# Patient Record
Sex: FEMALE | URBAN - METROPOLITAN AREA
[De-identification: names, ages, dates, MRNs, and addresses within clinical notes are randomized per-mention and may not be internally consistent; named-entity substitution may affect disease eponyms.]

---

## 2017-06-27 ENCOUNTER — INPATIENT (INPATIENT)
Facility: HOSPITAL | Age: 82
LOS: 7 days | Discharge: HOME | DRG: 244 | End: 2017-07-05
Attending: INTERNAL MEDICINE | Admitting: INTERNAL MEDICINE
Payer: MEDICARE

## 2017-06-27 VITALS
SYSTOLIC BLOOD PRESSURE: 135 MMHG | WEIGHT: 104.06 LBS | HEIGHT: 59 IN | RESPIRATION RATE: 17 BRPM | TEMPERATURE: 98 F | DIASTOLIC BLOOD PRESSURE: 52 MMHG | OXYGEN SATURATION: 98 % | HEART RATE: 62 BPM

## 2017-06-27 DIAGNOSIS — I21.4 NON-ST ELEVATION (NSTEMI) MYOCARDIAL INFARCTION: ICD-10-CM

## 2017-06-27 DIAGNOSIS — I21.0: ICD-10-CM

## 2017-06-27 DIAGNOSIS — Z90.49 ACQUIRED ABSENCE OF OTHER SPECIFIED PARTS OF DIGESTIVE TRACT: Chronic | ICD-10-CM

## 2017-06-27 PROCEDURE — 93010 ELECTROCARDIOGRAM REPORT: CPT

## 2017-06-27 PROCEDURE — 71010: CPT | Mod: 26

## 2017-06-27 PROCEDURE — 33208 INSRT HEART PM ATRIAL & VENT: CPT | Mod: KX

## 2017-06-27 RX ORDER — ASPIRIN/CALCIUM CARB/MAGNESIUM 324 MG
81 TABLET ORAL DAILY
Refills: 0 | Status: DISCONTINUED | OUTPATIENT
Start: 2017-06-27 | End: 2017-07-05

## 2017-06-27 RX ORDER — CARBIDOPA AND LEVODOPA 25; 100 MG/1; MG/1
1.5 TABLET ORAL DAILY
Refills: 0 | Status: DISCONTINUED | OUTPATIENT
Start: 2017-06-27 | End: 2017-06-29

## 2017-06-27 RX ORDER — CARBIDOPA AND LEVODOPA 25; 100 MG/1; MG/1
1 TABLET ORAL AT BEDTIME
Refills: 0 | Status: DISCONTINUED | OUTPATIENT
Start: 2017-06-27 | End: 2017-06-29

## 2017-06-27 RX ORDER — ROPINIROLE 8 MG/1
2 TABLET, FILM COATED, EXTENDED RELEASE ORAL THREE TIMES A DAY
Refills: 0 | Status: DISCONTINUED | OUTPATIENT
Start: 2017-06-27 | End: 2017-07-05

## 2017-06-27 RX ORDER — LEVETIRACETAM 250 MG/1
250 TABLET, FILM COATED ORAL
Refills: 0 | Status: DISCONTINUED | OUTPATIENT
Start: 2017-06-27 | End: 2017-07-05

## 2017-06-27 RX ORDER — QUETIAPINE FUMARATE 200 MG/1
25 TABLET, FILM COATED ORAL AT BEDTIME
Refills: 0 | Status: DISCONTINUED | OUTPATIENT
Start: 2017-06-27 | End: 2017-07-05

## 2017-06-27 RX ORDER — CARBIDOPA AND LEVODOPA 25; 100 MG/1; MG/1
2 TABLET ORAL DAILY
Refills: 0 | Status: DISCONTINUED | OUTPATIENT
Start: 2017-06-28 | End: 2017-06-29

## 2017-06-27 RX ORDER — CEFAZOLIN SODIUM 1 G
1000 VIAL (EA) INJECTION EVERY 8 HOURS
Refills: 0 | Status: COMPLETED | OUTPATIENT
Start: 2017-06-28 | End: 2017-06-28

## 2017-06-27 RX ORDER — CARBIDOPA AND LEVODOPA 25; 100 MG/1; MG/1
1 TABLET ORAL DAILY
Refills: 0 | Status: DISCONTINUED | OUTPATIENT
Start: 2017-06-28 | End: 2017-06-29

## 2017-06-27 RX ADMIN — ROPINIROLE 2 MILLIGRAM(S): 8 TABLET, FILM COATED, EXTENDED RELEASE ORAL at 22:25

## 2017-06-27 RX ADMIN — CARBIDOPA AND LEVODOPA 1 TABLET(S): 25; 100 TABLET ORAL at 22:25

## 2017-06-27 RX ADMIN — QUETIAPINE FUMARATE 25 MILLIGRAM(S): 200 TABLET, FILM COATED ORAL at 22:25

## 2017-06-27 NOTE — H&P CARDIOLOGY - REVIEW OF SYSTEMS
see HPI  Pt. found to have ecchymotic area over left eye and left forehead-daughter unaware of how/when pt had this occur.

## 2017-06-27 NOTE — H&P CARDIOLOGY - PMH
COPD (chronic obstructive pulmonary disease)    Dementia    Dysphagia    Former smoker    History of cholecystectomy    Parkinsons    Sick sinus syndrome    Syncope

## 2017-06-27 NOTE — H&P CARDIOLOGY - HISTORY OF PRESENT ILLNESS
This is a 89 yo female [facility] Dementia, Parkinson's, COPD, HTN, presented to hospital 5/27/17-6/2/17 s/p syncopal episode vs seizure per daughter. Pt. 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 Pt. was at home on 6/22/17 and had witnessed episode of blank stare and unresponsiveness lasting approx 20-30 seconds. Pt. was previously DNR now with MOLST form agreeable to treatment. Recent UTI treated with Cipro. Pt. now transferred for further evaluation and placement of PPM. This is a 89 yo female [facility] Dementia, Parkinson's, COPD, HTN, presented to hospital 5/27/17-6/2/17 s/p syncopal episode vs seizure per daughter. Pt. 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 Pt. was at home on 6/22/17 and had witnessed episode of blank stare and unresponsiveness lasting approx 20-30 seconds. Pt. was previously DNR now with MOLST form agreeable to treatment. Recent UTI treated with Cipro. Cardiac biomarkers. Pt. now transferred for further evaluation and placement of PPM.

## 2017-06-28 ENCOUNTER — TRANSCRIPTION ENCOUNTER (OUTPATIENT)
Age: 82
End: 2017-06-28

## 2017-06-28 DIAGNOSIS — I44.30 UNSPECIFIED ATRIOVENTRICULAR BLOCK: ICD-10-CM

## 2017-06-28 LAB
ANION GAP SERPL CALC-SCNC: 14 MMOL/L — SIGNIFICANT CHANGE UP (ref 5–17)
BUN SERPL-MCNC: 27 MG/DL — HIGH (ref 7–23)
CALCIUM SERPL-MCNC: 9.1 MG/DL — SIGNIFICANT CHANGE UP (ref 8.4–10.5)
CHLORIDE SERPL-SCNC: 101 MMOL/L — SIGNIFICANT CHANGE UP (ref 96–108)
CO2 SERPL-SCNC: 23 MMOL/L — SIGNIFICANT CHANGE UP (ref 22–31)
CREAT SERPL-MCNC: 0.91 MG/DL — SIGNIFICANT CHANGE UP (ref 0.5–1.3)
GLUCOSE SERPL-MCNC: 108 MG/DL — HIGH (ref 70–99)
HCT VFR BLD CALC: 43.3 % — SIGNIFICANT CHANGE UP (ref 34.5–45)
HGB BLD-MCNC: 14.1 G/DL — SIGNIFICANT CHANGE UP (ref 11.5–15.5)
MCHC RBC-ENTMCNC: 31 PG — SIGNIFICANT CHANGE UP (ref 27–34)
MCHC RBC-ENTMCNC: 32.6 GM/DL — SIGNIFICANT CHANGE UP (ref 32–36)
MCV RBC AUTO: 94.9 FL — SIGNIFICANT CHANGE UP (ref 80–100)
PLATELET # BLD AUTO: 169 K/UL — SIGNIFICANT CHANGE UP (ref 150–400)
POTASSIUM SERPL-MCNC: 4.5 MMOL/L — SIGNIFICANT CHANGE UP (ref 3.5–5.3)
POTASSIUM SERPL-SCNC: 4.5 MMOL/L — SIGNIFICANT CHANGE UP (ref 3.5–5.3)
RBC # BLD: 4.57 M/UL — SIGNIFICANT CHANGE UP (ref 3.8–5.2)
RBC # FLD: 12.4 % — SIGNIFICANT CHANGE UP (ref 10.3–14.5)
SODIUM SERPL-SCNC: 138 MMOL/L — SIGNIFICANT CHANGE UP (ref 135–145)
WBC # BLD: 13.2 K/UL — HIGH (ref 3.8–10.5)
WBC # FLD AUTO: 13.2 K/UL — HIGH (ref 3.8–10.5)

## 2017-06-28 RX ADMIN — Medication 100 MILLIGRAM(S): at 17:55

## 2017-06-28 RX ADMIN — Medication 100 MILLIGRAM(S): at 09:31

## 2017-06-28 RX ADMIN — Medication 100 MILLIGRAM(S): at 00:30

## 2017-06-28 RX ADMIN — ROPINIROLE 2 MILLIGRAM(S): 8 TABLET, FILM COATED, EXTENDED RELEASE ORAL at 21:18

## 2017-06-28 RX ADMIN — CARBIDOPA AND LEVODOPA 1.5 TABLET(S): 25; 100 TABLET ORAL at 12:33

## 2017-06-28 RX ADMIN — ROPINIROLE 2 MILLIGRAM(S): 8 TABLET, FILM COATED, EXTENDED RELEASE ORAL at 06:16

## 2017-06-28 RX ADMIN — CARBIDOPA AND LEVODOPA 1 TABLET(S): 25; 100 TABLET ORAL at 21:19

## 2017-06-28 RX ADMIN — Medication 81 MILLIGRAM(S): at 12:33

## 2017-06-28 RX ADMIN — QUETIAPINE FUMARATE 25 MILLIGRAM(S): 200 TABLET, FILM COATED ORAL at 21:18

## 2017-06-28 RX ADMIN — LEVETIRACETAM 250 MILLIGRAM(S): 250 TABLET, FILM COATED ORAL at 17:59

## 2017-06-28 RX ADMIN — ROPINIROLE 2 MILLIGRAM(S): 8 TABLET, FILM COATED, EXTENDED RELEASE ORAL at 14:36

## 2017-06-28 RX ADMIN — CARBIDOPA AND LEVODOPA 1.5 TABLET(S): 25; 100 TABLET ORAL at 12:17

## 2017-06-28 RX ADMIN — LEVETIRACETAM 250 MILLIGRAM(S): 250 TABLET, FILM COATED ORAL at 06:15

## 2017-06-28 NOTE — DIETITIAN INITIAL EVALUATION ADULT. - OTHER INFO
consult for chew swallow difficulty. consumes ~ 1 x Ensure daily PTA. daughter states that pt ws transferred from [facility], there she was on a nectar consistency diet but did not know if a swallow evaluation was performed. she states they told her it was a precaution. last BM 2 days ago. NKFA consult for chew swallow difficulty. consumes ~ 1 x Ensure daily PTA. daughter states that pt ws transferred from [facility] for further evaluation and placement of permanent pacemaker, there she was on a nectar consistency diet but did not know if a swallow evaluation was performed. she states they told her it was a precaution. last BM 2 days ago. NKFA consult for chew swallow difficulty. consumes ~ 1 x Ensure daily PTA. daughter states that pt ws transferred from [facility] for further evaluation and placement of permanent pacemaker, there she was on a nectar consistency diet but did not know if a swallow evaluation was performed. she states they told her it was a precaution. last BM 2 days ago. NKFA. Electro Physiology states that pt is being discharged today.

## 2017-06-28 NOTE — DISCHARGE NOTE ADULT - CARE PLAN
Principal Discharge DX:	Sick sinus syndrome  Goal:	s/p PPM  Instructions for follow-up, activity and diet:	your pacemaker can send an electrical signal to your heart when it is necessary  call your doctor if you feel dizzy, lightheaded, shortness of breath, confused, more tired, faint  you will follow up with your pacemaker doctor regularly to check your pacemaker  your pacemaker battery usually will last 5 - 8 years  avoid certain electric or magnetic equipment  if you cannot walk through metal detector, ask for hand security search  most of the time you will not be able to have MRI  follow directions  that you received about arm use and mobility, driving, sex & sling use  you make want to get a emergency medical bracelet telling peoply you have a pacemaker  tell any health care provider that you have a pacemaker  Secondary Diagnosis:	Parkinsons  Instructions for follow-up, activity and diet:	c/w sinemet Principal Discharge DX:	Sick sinus syndrome  Goal:	Status post Permanent pacemaker placement  Instructions for follow-up, activity and diet:	your pacemaker can send an electrical signal to your heart when it is necessary  call your doctor if you feel dizzy, lightheaded, shortness of breath, confused, more tired, faint  you will follow up with your pacemaker doctor regularly to check your pacemaker  your pacemaker battery usually will last 5 - 8 years  avoid certain electric or magnetic equipment  if you cannot walk through metal detector, ask for hand security search  most of the time you will not be able to have MRI  follow directions  that you received about arm use and mobility, driving, sex & sling use  you make want to get a emergency medical bracelet telling peoply you have a pacemaker  tell any health care provider that you have a pacemaker.  Follow-up with EP physician in 7 to 10 days. Call for appointment .  Secondary Diagnosis:	Parkinsons  Goal:	stable  Instructions for follow-up, activity and diet:	Continue with sinemet  Secondary Diagnosis:	COPD (chronic obstructive pulmonary disease)  Goal:	stable  Instructions for follow-up, activity and diet:	Call your Health Care provider upon arrival home to make a follow up appointment within one week.  Take all inhalers as prescribed by your Health Care Provider.  Take steroids as prescribed by your Health Care Provider.  If your cough increases infrequency and severity and/or you have shortness of breath or increased shortness of breath call your Health Care Provider.  If you develop fever, chills, night sweats, malaise, and/or change in mental status call your Health care Provider.  Nutrition is very important.  Eat small frequent meals.  Increase your activity as tolerated.  Do not stay in bed all day  Secondary Diagnosis:	Dementia  Instructions for follow-up, activity and diet:	To find out if someone is confused ask him or their name, age, and the date. If the person is unsure or answers incorrectly, he or she is confused.  Always introduce yourself, no matter how well the person knows you.  Often remind the person of his or her location.  Place a calendar and clock near the confused person.  Talk about current events and plans for the day.  Try to keep the environment calm, quiet and peaceful.  Make sure the patient keeps follow up appointments with their physician.  PREVENTION  Ways to prevent confusion:  Avoid alcohol.  Eat a balanced diet.  Get enough sleep.  Do not become isolated. Spend time with other people and make plans for your days.  Keep careful watch on your blood sugar levels if you are diabetic.  SEEK IMMEDIATE MEDICAL CARE IF:  You develop severe headaches, repeated vomiting, seizures, blackouts or slurred speech.  There is increasing confusion, weakness, numbness, restlessness or personality changes.  You develop a loss of balance, have marked dizziness, feel uncoordinated or fall.  You have delusions, hallucinations or develop severe anxiety.  Your family members think you need to be rechecked Principal Discharge DX:	Sick sinus syndrome  Goal:	Status post Permanent pacemaker placement  Instructions for follow-up, activity and diet:	You have a Wound check appointment with EP ([phone]) on July 12th, at 9am.  your pacemaker can send an electrical signal to your heart when it is necessary  call your doctor if you feel dizzy, lightheaded, shortness of breath, confused, more tired, faint  you will follow up with your pacemaker doctor regularly to check your pacemaker  your pacemaker battery usually will last 5 - 8 years  avoid certain electric or magnetic equipment  if you cannot walk through metal detector, ask for hand security search  most of the time you will not be able to have MRI  follow directions  that you received about arm use and mobility, driving, sex & sling use  you make want to get a emergency medical bracelet telling peoply you have a pacemaker  tell any health care provider that you have a pacemaker.  Follow-up with EP physician in 7 to 10 days. Call for appointment .  Secondary Diagnosis:	Parkinsons  Goal:	stable  Instructions for follow-up, activity and diet:	Continue with sinemet  Secondary Diagnosis:	COPD (chronic obstructive pulmonary disease)  Goal:	stable  Instructions for follow-up, activity and diet:	Call your Health Care provider upon arrival home to make a follow up appointment within one week.  Take all inhalers as prescribed by your Health Care Provider.  Take steroids as prescribed by your Health Care Provider.  If your cough increases infrequency and severity and/or you have shortness of breath or increased shortness of breath call your Health Care Provider.  If you develop fever, chills, night sweats, malaise, and/or change in mental status call your Health care Provider.  Nutrition is very important.  Eat small frequent meals.  Increase your activity as tolerated.  Do not stay in bed all day  Secondary Diagnosis:	Dementia  Instructions for follow-up, activity and diet:	To find out if someone is confused ask him or their name, age, and the date. If the person is unsure or answers incorrectly, he or she is confused.  Always introduce yourself, no matter how well the person knows you.  Often remind the person of his or her location.  Place a calendar and clock near the confused person.  Talk about current events and plans for the day.  Try to keep the environment calm, quiet and peaceful.  Make sure the patient keeps follow up appointments with their physician.  PREVENTION  Ways to prevent confusion:  Avoid alcohol.  Eat a balanced diet.  Get enough sleep.  Do not become isolated. Spend time with other people and make plans for your days.  Keep careful watch on your blood sugar levels if you are diabetic.  SEEK IMMEDIATE MEDICAL CARE IF:  You develop severe headaches, repeated vomiting, seizures, blackouts or slurred speech.  There is increasing confusion, weakness, numbness, restlessness or personality changes.  You develop a loss of balance, have marked dizziness, feel uncoordinated or fall.  You have delusions, hallucinations or develop severe anxiety.  Your family members think you need to be rechecked Principal Discharge DX:	Sick sinus syndrome  Goal:	Status post Permanent pacemaker placement  Instructions for follow-up, activity and diet:	You have a Wound check appointment with EP ([phone]) on July 12th, at 9am.  your pacemaker can send an electrical signal to your heart when it is necessary  call your doctor if you feel dizzy, lightheaded, shortness of breath, confused, more tired, faint  you will follow up with your pacemaker doctor regularly to check your pacemaker  your pacemaker battery usually will last 5 - 8 years  avoid certain electric or magnetic equipment  if you cannot walk through metal detector, ask for hand security search  most of the time you will not be able to have MRI  follow directions  that you received about arm use and mobility, driving, sex & sling use  you make want to get a emergency medical bracelet telling peoply you have a pacemaker  tell any health care provider that you have a pacemaker.  Follow-up with EP physician in 7 to 10 days. Call for appointment .  Secondary Diagnosis:	Parkinsons  Goal:	stable  Instructions for follow-up, activity and diet:	Continue with sinemet  Secondary Diagnosis:	COPD (chronic obstructive pulmonary disease)  Goal:	stable  Instructions for follow-up, activity and diet:	Call your Health Care provider upon arrival home to make a follow up appointment within one week.  Take all inhalers as prescribed by your Health Care Provider.  Take steroids as prescribed by your Health Care Provider.  If your cough increases infrequency and severity and/or you have shortness of breath or increased shortness of breath call your Health Care Provider.  If you develop fever, chills, night sweats, malaise, and/or change in mental status call your Health care Provider.  Nutrition is very important.  Eat small frequent meals.  Increase your activity as tolerated.  Do not stay in bed all day  Secondary Diagnosis:	Dementia  Instructions for follow-up, activity and diet:	To find out if someone is confused ask him or their name, age, and the date. If the person is unsure or answers incorrectly, he or she is confused.  Always introduce yourself, no matter how well the person knows you.  Often remind the person of his or her location.  Place a calendar and clock near the confused person.  Talk about current events and plans for the day.  Try to keep the environment calm, quiet and peaceful.  Make sure the patient keeps follow up appointments with their physician.  PREVENTION  Ways to prevent confusion:  Avoid alcohol.  Eat a balanced diet.  Get enough sleep.  Do not become isolated. Spend time with other people and make plans for your days.  Keep careful watch on your blood sugar levels if you are diabetic.  SEEK IMMEDIATE MEDICAL CARE IF:  You develop severe headaches, repeated vomiting, seizures, blackouts or slurred speech.  There is increasing confusion, weakness, numbness, restlessness or personality changes.  You develop a loss of balance, have marked dizziness, feel uncoordinated or fall.  You have delusions, hallucinations or develop severe anxiety.  Your family members think you need to be rechecked Principal Discharge DX:	Sick sinus syndrome  Goal:	Status post Permanent pacemaker placement  Instructions for follow-up, activity and diet:	You have a Wound check appointment with EP ([phone]) on July 12th, at 9am.  your pacemaker can send an electrical signal to your heart when it is necessary  call your doctor if you feel dizzy, lightheaded, shortness of breath, confused, more tired, faint  you will follow up with your pacemaker doctor regularly to check your pacemaker  your pacemaker battery usually will last 5 - 8 years  avoid certain electric or magnetic equipment  if you cannot walk through metal detector, ask for hand security search  most of the time you will not be able to have MRI  follow directions  that you received about arm use and mobility, driving, sex & sling use  you make want to get a emergency medical bracelet telling peoply you have a pacemaker  tell any health care provider that you have a pacemaker.  Follow-up with EP physician in 7 to 10 days. Call for appointment .  Secondary Diagnosis:	Parkinsons  Goal:	stable  Instructions for follow-up, activity and diet:	Continue with sinemet  Secondary Diagnosis:	COPD (chronic obstructive pulmonary disease)  Goal:	stable  Instructions for follow-up, activity and diet:	Call your Health Care provider upon arrival home to make a follow up appointment within one week.  Take all inhalers as prescribed by your Health Care Provider.  Take steroids as prescribed by your Health Care Provider.  If your cough increases infrequency and severity and/or you have shortness of breath or increased shortness of breath call your Health Care Provider.  If you develop fever, chills, night sweats, malaise, and/or change in mental status call your Health care Provider.  Nutrition is very important.  Eat small frequent meals.  Increase your activity as tolerated.  Do not stay in bed all day  Secondary Diagnosis:	Dementia  Instructions for follow-up, activity and diet:	To find out if someone is confused ask him or their name, age, and the date. If the person is unsure or answers incorrectly, he or she is confused.  Always introduce yourself, no matter how well the person knows you.  Often remind the person of his or her location.  Place a calendar and clock near the confused person.  Talk about current events and plans for the day.  Try to keep the environment calm, quiet and peaceful.  Make sure the patient keeps follow up appointments with their physician.  PREVENTION  Ways to prevent confusion:  Avoid alcohol.  Eat a balanced diet.  Get enough sleep.  Do not become isolated. Spend time with other people and make plans for your days.  Keep careful watch on your blood sugar levels if you are diabetic.  SEEK IMMEDIATE MEDICAL CARE IF:  You develop severe headaches, repeated vomiting, seizures, blackouts or slurred speech.  There is increasing confusion, weakness, numbness, restlessness or personality changes.  You develop a loss of balance, have marked dizziness, feel uncoordinated or fall.  You have delusions, hallucinations or develop severe anxiety.  Your family members think you need to be rechecked

## 2017-06-28 NOTE — DISCHARGE NOTE ADULT - CARE PROVIDER_API CALL
Taiwo Lewis (DO), Neurology  [address] [facility], [place] [facility]  Phone: [phone]  Fax: [phone] Taiwo Lewis (DO), Neurology  [address] [place] [address]  Phone: [phone]  Fax: [phone]    Amber Lewis), Cardiac Electrophysiology; Cardiovascular Disease  [address] [place] [address]  Phone: [phone]  Fax: [phone]

## 2017-06-28 NOTE — DISCHARGE NOTE ADULT - MEDICATION SUMMARY - MEDICATIONS TO TAKE
I will START or STAY ON the medications listed below when I get home from the hospital:    Aspirin Enteric Coated 81 mg oral delayed release tablet  -- 1 tab(s) by mouth once a day Home  -- Indication: For Cardiac protection    Keppra 250 mg oral tablet  -- 1 tab(s) by mouth 2 times a day Home  -- Indication: For Seizures    Sinemet 25 mg-100 mg oral tablet  -- 1.5 tab(s) by mouth 2 times a day AM and Lunch  -- Indication: For Parkinsons    rOPINIRole 2 mg oral tablet  -- 1 tab(s) by mouth 3 times a day Home  -- Indication: For Parkinsons    Sinemet 25 mg-100 mg oral tablet  -- 2 tab(s) by mouth once a day at 1800  -- Indication: For Parkinsons    Sinemet CR 50 mg-200 mg oral tablet, extended release  -- 1 tab(s) by mouth once a day (at bedtime)  -- Indication: For Parkinsons    SEROquel 25 mg oral tablet  -- 1 tab(s) by mouth once a day (at bedtime) Home  -- Indication: For Demenatia sleep    rivastigmine 6 mg oral capsule  -- 1 cap(s) by mouth 2 times a day Home  -- Indication: For Dementia , parkinsons I will START or STAY ON the medications listed below when I get home from the hospital:    Aspirin Enteric Coated 81 mg oral delayed release tablet  -- 1 tab(s) by mouth once a day Home  -- Indication: For Cardiac protection    acetaminophen 325 mg oral tablet  -- 2 tab(s) by mouth every 6 hours, As needed, Mild Pain (1 - 3)  -- Indication: For Pain    Keppra 250 mg oral tablet  -- 1 tab(s) by mouth 2 times a day Home  -- Indication: For Seizures    Sinemet 25 mg-100 mg oral tablet  -- 1.5 tab(s) by mouth 2 times a day AM and Lunch  -- Indication: For Parkinsons    rOPINIRole 2 mg oral tablet  -- 1 tab(s) by mouth 3 times a day Home  -- Indication: For Parkinsons    Sinemet 25 mg-100 mg oral tablet  -- 2 tab(s) by mouth once a day at 1800  -- Indication: For Parkinsons    Sinemet CR 50 mg-200 mg oral tablet, extended release  -- 1 tab(s) by mouth once a day (at bedtime)  -- Indication: For Parkinsons    SEROquel 25 mg oral tablet  -- 1 tab(s) by mouth once a day (at bedtime) Home  -- Indication: For Demenatia sleep    rivastigmine 6 mg oral capsule  -- 1 cap(s) by mouth 2 times a day Home  -- Indication: For Dementia , parkinsons I will START or STAY ON the medications listed below when I get home from the hospital:    Aspirin Enteric Coated 81 mg oral delayed release tablet  -- 1 tab(s) by mouth once a day Home  -- Indication: For Cardiac protection    acetaminophen 325 mg oral tablet  -- 2 tab(s) by mouth every 6 hours, As needed, Mild Pain (1 - 3)  -- Indication: For Pain    Keppra 250 mg oral tablet  -- 1 tab(s) by mouth 2 times a day Home  -- Indication: For Seizures    rOPINIRole 2 mg oral tablet  -- 1 tab(s) by mouth 3 times a day Home  -- Indication: For Parkinsons    Sinemet 25 mg-100 mg oral tablet  -- 2 tab(s) by mouth once a day at 1800  -- Indication: For Parkinsons    Sinemet CR 50 mg-200 mg oral tablet, extended release  -- 1 tab(s) by mouth once a day (at bedtime)  -- Indication: For Parkinsons    Sinemet 25 mg-100 mg oral tablet  -- 1.5 tab(s) by mouth 2 times a day AM and Lunch  -- Indication: For Parkinsons    SEROquel 25 mg oral tablet  -- 1 tab(s) by mouth once a day (at bedtime) Home  -- Indication: For Demenatia sleep

## 2017-06-28 NOTE — DISCHARGE NOTE ADULT - PATIENT PORTAL LINK FT
“You can access the FollowHealth Patient Portal, offered by [facility], by registering with the following website: [email or web address]”

## 2017-06-28 NOTE — DISCHARGE NOTE ADULT - PLAN OF CARE
s/p PPM your pacemaker can send an electrical signal to your heart when it is necessary  call your doctor if you feel dizzy, lightheaded, shortness of breath, confused, more tired, faint  you will follow up with your pacemaker doctor regularly to check your pacemaker  your pacemaker battery usually will last 5 - 8 years  avoid certain electric or magnetic equipment  if you cannot walk through metal detector, ask for hand security search  most of the time you will not be able to have MRI  follow directions  that you received about arm use and mobility, driving, sex & sling use  you make want to get a emergency medical bracelet telling peoply you have a pacemaker  tell any health care provider that you have a pacemaker c/w sinemet stable Call your Health Care provider upon arrival home to make a follow up appointment within one week.  Take all inhalers as prescribed by your Health Care Provider.  Take steroids as prescribed by your Health Care Provider.  If your cough increases infrequency and severity and/or you have shortness of breath or increased shortness of breath call your Health Care Provider.  If you develop fever, chills, night sweats, malaise, and/or change in mental status call your Health care Provider.  Nutrition is very important.  Eat small frequent meals.  Increase your activity as tolerated.  Do not stay in bed all day To find out if someone is confused ask him or their name, age, and the date. If the person is unsure or answers incorrectly, he or she is confused.  Always introduce yourself, no matter how well the person knows you.  Often remind the person of his or her location.  Place a calendar and clock near the confused person.  Talk about current events and plans for the day.  Try to keep the environment calm, quiet and peaceful.  Make sure the patient keeps follow up appointments with their physician.  PREVENTION  Ways to prevent confusion:  Avoid alcohol.  Eat a balanced diet.  Get enough sleep.  Do not become isolated. Spend time with other people and make plans for your days.  Keep careful watch on your blood sugar levels if you are diabetic.  SEEK IMMEDIATE MEDICAL CARE IF:  You develop severe headaches, repeated vomiting, seizures, blackouts or slurred speech.  There is increasing confusion, weakness, numbness, restlessness or personality changes.  You develop a loss of balance, have marked dizziness, feel uncoordinated or fall.  You have delusions, hallucinations or develop severe anxiety.  Your family members think you need to be rechecked Status post Permanent pacemaker placement your pacemaker can send an electrical signal to your heart when it is necessary  call your doctor if you feel dizzy, lightheaded, shortness of breath, confused, more tired, faint  you will follow up with your pacemaker doctor regularly to check your pacemaker  your pacemaker battery usually will last 5 - 8 years  avoid certain electric or magnetic equipment  if you cannot walk through metal detector, ask for hand security search  most of the time you will not be able to have MRI  follow directions  that you received about arm use and mobility, driving, sex & sling use  you make want to get a emergency medical bracelet telling peoply you have a pacemaker  tell any health care provider that you have a pacemaker.  Follow-up with EP physician in 7 to 10 days. Call for appointment . Continue with sinemet You have a Wound check appointment with EP ([phone]) on July 12th, at 9am.  your pacemaker can send an electrical signal to your heart when it is necessary  call your doctor if you feel dizzy, lightheaded, shortness of breath, confused, more tired, faint  you will follow up with your pacemaker doctor regularly to check your pacemaker  your pacemaker battery usually will last 5 - 8 years  avoid certain electric or magnetic equipment  if you cannot walk through metal detector, ask for hand security search  most of the time you will not be able to have MRI  follow directions  that you received about arm use and mobility, driving, sex & sling use  you make want to get a emergency medical bracelet telling peoply you have a pacemaker  tell any health care provider that you have a pacemaker.  Follow-up with EP physician in 7 to 10 days. Call for appointment .

## 2017-06-28 NOTE — DISCHARGE NOTE ADULT - ADDITIONAL INSTRUCTIONS
please follow up with EP on 07/07/2017 for wound check Follow up with EP on 07/12/2017 at 9am for wound check

## 2017-06-28 NOTE — PROGRESS NOTE ADULT - PROBLEM SELECTOR PLAN 1
telemetry stable  post device teaching done with family, ID card book given to family  chest xray lead tips  in good placement   VSS LABS stable, clear by EPS to go home today after last dose of ABX to F/U in EP lab on telemetry stable  post device teaching done with family, ID card book given to family  chest xray lead tips  in good placement   VSS LABS stable, clear by EPS to go home today after last dose of ABX to F/U in EP clinic in 7- 10 days [phone]

## 2017-06-28 NOTE — PROGRESS NOTE ADULT - ASSESSMENT
89 yo f [facility] Dementia, Parkinson's, COPD, HTN, presented to hospital 5/27/17-6/2/17 s/p syncopal episode vs seizure per daughter. Holter monitor reveals  2nd degree HB. Witnessed episode of blank stare on 6/22 at home  and unresponsiveness lasting approx 20-30 seconds. Previously DNR now with MOLST form. Now s/p transfer to [facility]  s/p PPM  implant 6/27.  This is a 89 yo female PMH Dementia, Parkinson's, COPD, HTN, presented to hospital 5/27/17-6/2/17 s/p syncopal episode vs seizure per daughter. Pt. 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 Pt. was at home on 6/22/17 and had witnessed episode of blank stare and unresponsiveness lasting approx 20-30 seconds. Pt. was previously DNR now with MOLST form agreeable to treatment. Recent UTI treated with Cipro. Pt. now transferred for further evaluation and placement of PPM.

## 2017-06-28 NOTE — DISCHARGE NOTE ADULT - MEDICATION SUMMARY - MEDICATIONS TO STOP TAKING
I will STOP taking the medications listed below when I get home from the hospital:  None I will STOP taking the medications listed below when I get home from the hospital:    rivastigmine 6 mg oral capsule  -- 1 cap(s) by mouth 2 times a day Home

## 2017-06-28 NOTE — DISCHARGE NOTE ADULT - CARE PROVIDERS DIRECT ADDRESSES
,DirectAddress_Unknown ,DirectAddress_Unknown,[email or web address]

## 2017-06-28 NOTE — PROGRESS NOTE ADULT - SUBJECTIVE AND OBJECTIVE BOX
HPI: sitting up in chair   MEDICATIONS  (STANDING):  ceFAZolin   IVPB 1000 milliGRAM(s) IV Intermittent every 8 hours  aspirin enteric coated 81 milliGRAM(s) Oral daily  levETIRAcetam 250 milliGRAM(s) Oral two times a day  rOPINIRole 2 milliGRAM(s) Oral three times a day  QUEtiapine 25 milliGRAM(s) Oral at bedtime  carbidopa/levodopa  25/100 1.5 Tablet(s) Oral daily  carbidopa/levodopa CR 50/200 1 Tablet(s) Oral at bedtime  carbidopa/levodopa  25/100 1 Tablet(s) Oral daily  carbidopa/levodopa  25/100 2 Tablet(s) Oral daily    MEDICATIONS  (PRN):    Allergies    No Known Allergies    Intolerances    Namenda (Other (Severe))    ROS:  Shakes head no to chest pain SOB HA, coughing abd painVital Signs Last 24 Hrs  T(C): 36.6 (28 Jun 2017 14:11), Max: 36.9 (28 Jun 2017 04:15)  T(F): 97.8 (28 Jun 2017 14:11), Max: 98.4 (28 Jun 2017 04:15)  HR: 90 (28 Jun 2017 14:11) (60 - 90)  BP: 125/73 (28 Jun 2017 14:11) (99/51 - 152/71)  BP(mean): --  RR: 18 (28 Jun 2017 14:11) (17 - 18)  SpO2: 92% (28 Jun 2017 14:11) (92% - 97%)  Physical Exam:  gen thin elderly female   Neurological: Alert, calm cooperative, following commands  HEENT:   Neck supple.  Respiratory: CTA B/L, No wheezing/crackles/rhonchi  Cardiovascular: (+) S1 & S2, RRR  Gastrointestinal: soft, NT, nondistended, (+) BS  Extremities: No pedal edemaperipheral pulses positive   Skin:left infraclavicular incision line intact no hematoma    LABS:                        14.1   13.2  )-----------( 169      ( 28 Jun 2017 06:59 )             43.3     06-28    138  |  101  |  27<H>  ----------------------------<  108<H>  4.5   |  23  |  0.91    Ca    9.1      28 Jun 2017 06:59    RADIOLOGY & ADDITIONAL TESTS:  chest xray 6/27 lungs clear , no pneumothorax    TELE:   EKG: HPI: sitting up in chair   MEDICATIONS  (STANDING):  ceFAZolin   IVPB 1000 milliGRAM(s) IV Intermittent every 8 hours  aspirin enteric coated 81 milliGRAM(s) Oral daily  levETIRAcetam 250 milliGRAM(s) Oral two times a day  rOPINIRole 2 milliGRAM(s) Oral three times a day  QUEtiapine 25 milliGRAM(s) Oral at bedtime  carbidopa/levodopa  25/100 1.5 Tablet(s) Oral daily  carbidopa/levodopa CR 50/200 1 Tablet(s) Oral at bedtime  carbidopa/levodopa  25/100 1 Tablet(s) Oral daily  carbidopa/levodopa  25/100 2 Tablet(s) Oral daily    No Known Allergies    Intolerances    Namenda (Other (Severe))    ROS:  Shakes head no to chest pain SOB HA, coughing abd painVital Signs Last 24 Hrs    T(C): 36.6 (28 Jun 2017 14:11), Max: 36.9 (28 Jun 2017 04:15)  T(F): 97.8 (28 Jun 2017 14:11), Max: 98.4 (28 Jun 2017 04:15)  HR: 90 (28 Jun 2017 14:11) (60 - 90)  BP: 125/73 (28 Jun 2017 14:11) (99/51 - 152/71)  BP(mean): --  RR: 18 (28 Jun 2017 14:11) (17 - 18)  SpO2: 92% (28 Jun 2017 14:11) (92% - 97%)    Physical Exam:  gen: thin elderly female , NAD  Neurological: Alert, calm cooperative, following commands  HEENT:   Neck supple.  Respiratory: CTA B/L, No wheezing/crackles/rhonchi  Cardiovascular: (+) S1 & S2, RRR  Gastrointestinal: soft, NT, nondistended, (+) BS  Extremities: No pedal edemaperipheral pulses positive   Skin:left infraclavicular incision line intact no hematoma    LABS:                        14.1   13.2  )-----------( 169      ( 28 Jun 2017 06:59 )             43.3     06-28    138  |  101  |  27<H>  ----------------------------<  108<H>  4.5   |  23  |  0.91    Ca    9.1      28 Jun 2017 06:59    RADIOLOGY & ADDITIONAL TESTS:  chest xray 6/27 lungs clear , no pneumothorax    TELE:   EKG: HPI: sitting up in chair   MEDICATIONS  (STANDING):  ceFAZolin   IVPB 1000 milliGRAM(s) IV Intermittent every 8 hours  aspirin enteric coated 81 milliGRAM(s) Oral daily  levETIRAcetam 250 milliGRAM(s) Oral two times a day  rOPINIRole 2 milliGRAM(s) Oral three times a day  QUEtiapine 25 milliGRAM(s) Oral at bedtime  carbidopa/levodopa  25/100 1.5 Tablet(s) Oral daily  carbidopa/levodopa CR 50/200 1 Tablet(s) Oral at bedtime  carbidopa/levodopa  25/100 1 Tablet(s) Oral daily  carbidopa/levodopa  25/100 2 Tablet(s) Oral daily    No Known Allergies    Intolerances    Namenda (Other (Severe))    ROS:  Shakes head no to chest pain SOB HA, coughing abd painVital Signs Last 24 Hrs    T(C): 36.6 (28 Jun 2017 14:11), Max: 36.9 (28 Jun 2017 04:15)  T(F): 97.8 (28 Jun 2017 14:11), Max: 98.4 (28 Jun 2017 04:15)  HR: 90 (28 Jun 2017 14:11) (60 - 90)  BP: 125/73 (28 Jun 2017 14:11) (99/51 - 152/71)  BP(mean): --  RR: 18 (28 Jun 2017 14:11) (17 - 18)  SpO2: 92% (28 Jun 2017 14:11) (92% - 97%)    Physical Exam:  gen: thin elderly female , NAD  Neurological: Alert, calm cooperative, following commands  HEENT:   Neck supple.  Respiratory: CTA B/L, No wheezing/crackles/rhonchi  Cardiovascular: (+) S1 & S2, RRR  Gastrointestinal: soft, NT, nondistended, (+) BS  Extremities: No pedal edemaperipheral pulses positive   Skin:left infraclavicular incision line intact no hematoma    LABS:                        14.1   13.2  )-----------( 169      ( 28 Jun 2017 06:59 )             43.3     06-28    138  |  101  |  27<H>  ----------------------------<  108<H>  4.5   |  23  |  0.91    Ca    9.1      28 Jun 2017 06:59    RADIOLOGY & ADDITIONAL TESTS:  chest xray 6/27 lungs clear , no pneumothorax    TELE: SR 70's  EKG: SR 72 bpm APC

## 2017-06-28 NOTE — DISCHARGE NOTE ADULT - HOSPITAL COURSE
89 yo female [facility] Dementia, Parkinson's, COPD, HTN, presented to hospital 5/27/17-6/2/17 s/p syncopal episode vs seizure per daughter. Holter monitor reveals  2nd degree HB. On  6/22/17 at home witnessed episode of blank stare and unresponsiveness lasting approx 20-30 seconds. Patient was previously DNR now with MOLST form agreeable to treatment. Recent UTI treated with Cipro. 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 Pt. 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 Pt. was at home on 6/22/17 and had witnessed episode of blank stare and unresponsiveness lasting approx 20-30 seconds. Pt. was previously DNR now with MOLST form agreeable to treatment. Recent UTI treated with Cipro. Pt. now transferred for further evaluation and placement of PPM.

## 2017-06-28 NOTE — DIETITIAN INITIAL EVALUATION ADULT. - NUTRITION INTERVENTION
Meals and Snack/Medical Food Supplements/Nutrition Education Discharge and Transfer of Nutrition Care to New Setting Meals and Snack/Medical Food Supplements/Nutrition Education/Collaboration and Referral of Nutrition Care

## 2017-06-29 ENCOUNTER — TRANSCRIPTION ENCOUNTER (OUTPATIENT)
Age: 82
End: 2017-06-29

## 2017-06-29 PROBLEM — Z00.00 ENCOUNTER FOR PREVENTIVE HEALTH EXAMINATION: Status: ACTIVE | Noted: 2017-06-29

## 2017-06-29 LAB
ANION GAP SERPL CALC-SCNC: 11 MMOL/L — SIGNIFICANT CHANGE UP (ref 5–17)
BUN SERPL-MCNC: 25 MG/DL — HIGH (ref 7–23)
CALCIUM SERPL-MCNC: 9.5 MG/DL — SIGNIFICANT CHANGE UP (ref 8.4–10.5)
CHLORIDE SERPL-SCNC: 101 MMOL/L — SIGNIFICANT CHANGE UP (ref 96–108)
CO2 SERPL-SCNC: 27 MMOL/L — SIGNIFICANT CHANGE UP (ref 22–31)
CREAT SERPL-MCNC: 0.87 MG/DL — SIGNIFICANT CHANGE UP (ref 0.5–1.3)
GLUCOSE SERPL-MCNC: 102 MG/DL — HIGH (ref 70–99)
HCT VFR BLD CALC: 41.4 % — SIGNIFICANT CHANGE UP (ref 34.5–45)
HGB BLD-MCNC: 13.8 G/DL — SIGNIFICANT CHANGE UP (ref 11.5–15.5)
MCHC RBC-ENTMCNC: 31.7 PG — SIGNIFICANT CHANGE UP (ref 27–34)
MCHC RBC-ENTMCNC: 33.4 GM/DL — SIGNIFICANT CHANGE UP (ref 32–36)
MCV RBC AUTO: 94.8 FL — SIGNIFICANT CHANGE UP (ref 80–100)
PLATELET # BLD AUTO: 158 K/UL — SIGNIFICANT CHANGE UP (ref 150–400)
POTASSIUM SERPL-MCNC: 4.4 MMOL/L — SIGNIFICANT CHANGE UP (ref 3.5–5.3)
POTASSIUM SERPL-SCNC: 4.4 MMOL/L — SIGNIFICANT CHANGE UP (ref 3.5–5.3)
RBC # BLD: 4.37 M/UL — SIGNIFICANT CHANGE UP (ref 3.8–5.2)
RBC # FLD: 12.1 % — SIGNIFICANT CHANGE UP (ref 10.3–14.5)
SODIUM SERPL-SCNC: 139 MMOL/L — SIGNIFICANT CHANGE UP (ref 135–145)
WBC # BLD: 12.3 K/UL — HIGH (ref 3.8–10.5)
WBC # FLD AUTO: 12.3 K/UL — HIGH (ref 3.8–10.5)

## 2017-06-29 RX ORDER — CARBIDOPA AND LEVODOPA 25; 100 MG/1; MG/1
2 TABLET ORAL
Refills: 0 | Status: DISCONTINUED | OUTPATIENT
Start: 2017-06-29 | End: 2017-07-05

## 2017-06-29 RX ORDER — CARBIDOPA AND LEVODOPA 25; 100 MG/1; MG/1
1.5 TABLET ORAL
Refills: 0 | Status: DISCONTINUED | OUTPATIENT
Start: 2017-06-29 | End: 2017-07-05

## 2017-06-29 RX ORDER — CARBIDOPA AND LEVODOPA 25; 100 MG/1; MG/1
1 TABLET ORAL AT BEDTIME
Refills: 0 | Status: DISCONTINUED | OUTPATIENT
Start: 2017-06-29 | End: 2017-07-05

## 2017-06-29 RX ORDER — ACETAMINOPHEN 500 MG
650 TABLET ORAL EVERY 6 HOURS
Refills: 0 | Status: DISCONTINUED | OUTPATIENT
Start: 2017-06-29 | End: 2017-07-05

## 2017-06-29 RX ADMIN — ROPINIROLE 2 MILLIGRAM(S): 8 TABLET, FILM COATED, EXTENDED RELEASE ORAL at 21:48

## 2017-06-29 RX ADMIN — CARBIDOPA AND LEVODOPA 1 TABLET(S): 25; 100 TABLET ORAL at 21:48

## 2017-06-29 RX ADMIN — Medication 81 MILLIGRAM(S): at 11:28

## 2017-06-29 RX ADMIN — QUETIAPINE FUMARATE 25 MILLIGRAM(S): 200 TABLET, FILM COATED ORAL at 21:48

## 2017-06-29 RX ADMIN — CARBIDOPA AND LEVODOPA 2 TABLET(S): 25; 100 TABLET ORAL at 18:00

## 2017-06-29 RX ADMIN — Medication 650 MILLIGRAM(S): at 15:21

## 2017-06-29 RX ADMIN — ROPINIROLE 2 MILLIGRAM(S): 8 TABLET, FILM COATED, EXTENDED RELEASE ORAL at 13:53

## 2017-06-29 RX ADMIN — CARBIDOPA AND LEVODOPA 1.5 TABLET(S): 25; 100 TABLET ORAL at 11:28

## 2017-06-29 RX ADMIN — LEVETIRACETAM 250 MILLIGRAM(S): 250 TABLET, FILM COATED ORAL at 18:00

## 2017-06-29 RX ADMIN — ROPINIROLE 2 MILLIGRAM(S): 8 TABLET, FILM COATED, EXTENDED RELEASE ORAL at 05:27

## 2017-06-29 RX ADMIN — Medication 650 MILLIGRAM(S): at 13:53

## 2017-06-29 RX ADMIN — LEVETIRACETAM 250 MILLIGRAM(S): 250 TABLET, FILM COATED ORAL at 05:27

## 2017-06-29 NOTE — PROGRESS NOTE ADULT - ASSESSMENT
89 yo female with PMHX significant for Dementia, Parkinson's, COPD, HTN, seizures, syncope.  Recently treated with antibiotics for UTI.  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 Now s/p PPM  implant 6/27.   This is a 89 yo female PMH Dementia, Parkinson's, COPD, HTN, presented to hospital 5/27/17-6/2/17 s/p syncopal episode vs seizure per daughter. Pt. 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 Pt. was at home on 6/22/17 and had witnessed episode of blank stare and unresponsiveness lasting approx 20-30 seconds. Pt. was previously DNR now with MOLST form agreeable to treatment. Recent UTI treated with Cipro. Pt. now transferred for further evaluation and placement of PPM.

## 2017-06-29 NOTE — PROGRESS NOTE ADULT - PROBLEM SELECTOR PLAN 1
Tolerated procedure well  Post device teaching reinforced with patient family   S/P PT evaluation recommending Rehab placement   Tylenol PRN mild pain <<----- Click to add NO pertinent Family History

## 2017-06-29 NOTE — PROGRESS NOTE ADULT - SUBJECTIVE AND OBJECTIVE BOX
INTERVAL HPI/OVERNIGHT EVENTS:    MEDICATIONS  (STANDING):  aspirin enteric coated 81 milliGRAM(s) Oral daily  levETIRAcetam 250 milliGRAM(s) Oral two times a day  rOPINIRole 2 milliGRAM(s) Oral three times a day  QUEtiapine 25 milliGRAM(s) Oral at bedtime  carbidopa/levodopa  25/100 1.5 Tablet(s) Oral daily  carbidopa/levodopa CR 50/200 1 Tablet(s) Oral at bedtime  carbidopa/levodopa  25/100 1 Tablet(s) Oral daily  carbidopa/levodopa  25/100 2 Tablet(s) Oral daily    MEDICATIONS  (PRN):  acetaminophen   Tablet. 650 milliGRAM(s) Oral every 6 hours PRN Mild Pain (1 - 3)      Allergies    No Known Allergies    Intolerances    Namenda (Other (Severe))    ROS:  General: Pt denies fever/chills    Cardiovascular: denies chest pain/palpitations/leg edema    Respiratory and Thorax: denies SOB/cough/wheezing    Gastrointestinal: denies abdominal pain/diarrhea/constipation/bloody stool    Musculoskeletal: denies joint pain or swelling, denies restricted motion    Skin: denies rashes/sores    Hematologic: denies abnormal bleeding    Vital Signs Last 24 Hrs  T(C): 37.2 (29 Jun 2017 12:26), Max: 37.2 (29 Jun 2017 12:26)  T(F): 98.9 (29 Jun 2017 12:26), Max: 98.9 (29 Jun 2017 12:26)  HR: 85 (29 Jun 2017 12:26) (78 - 85)  BP: 130/55 (29 Jun 2017 12:26) (107/68 - 150/71)  BP(mean): --  RR: 18 (29 Jun 2017 12:26) (18 - 18)  SpO2: 95% (29 Jun 2017 12:26) (93% - 95%)    Physical Exam:  Constitutional: well developed, well nourished,  and no acute distress    Neurological: Alert & Oriented x 3,  no focal deficits    HEENT:   Neck supple.    Respiratory: CTA B/L, No wheezing/crackles/rhonchi    Cardiovascular: (+) S1 & S2, RRR    Gastrointestinal: soft, NT, nondistended, (+) BS    Genitourinary: non distended bladder, voiding freely    Extremities: No pedal edema, No clubbing, No cyanosis    Skin:  normal skin color and pigmentation, no skin lesions          LABS:                        13.8   12.3  )-----------( 158      ( 29 Jun 2017 06:30 )             41.4     06-29    139  |  101  |  25<H>  ----------------------------<  102<H>  4.4   |  27  |  0.87    Ca    9.5      29 Jun 2017 06:30            RADIOLOGY & ADDITIONAL TESTS:    TELE:    EKG: INTERVAL HPI/OVERNIGHT EVENTS: Patient pleasantly confused, complains of mild pain at chest wall wound site.     MEDICATIONS  (STANDING):  aspirin enteric coated 81 milliGRAM(s) Oral daily  levETIRAcetam 250 milliGRAM(s) Oral two times a day  rOPINIRole 2 milliGRAM(s) Oral three times a day  QUEtiapine 25 milliGRAM(s) Oral at bedtime  carbidopa/levodopa  25/100 1.5 Tablet(s) Oral daily  carbidopa/levodopa CR 50/200 1 Tablet(s) Oral at bedtime  carbidopa/levodopa  25/100 1 Tablet(s) Oral daily  carbidopa/levodopa  25/100 2 Tablet(s) Oral daily    MEDICATIONS  (PRN):  acetaminophen   Tablet. 650 milliGRAM(s) Oral every 6 hours PRN Mild Pain (1 - 3)      Allergies    No Known Allergies    Intolerances    Namenda (Other (Severe))    ROS:  General: Pt denies fever/chills    Cardiovascular: denies chest pain/palpitations/leg edema    Respiratory and Thorax: nonproductive cough     Gastrointestinal: denies abdominal pain/diarrhea/constipation/bloody stool    Musculoskeletal: denies joint pain or swelling, denies restricted motion    Skin: denies rashes/sores    Hematologic: denies abnormal bleeding    Vital Signs Last 24 Hrs  T(C): 37.2 (29 Jun 2017 12:26), Max: 37.2 (29 Jun 2017 12:26)  T(F): 98.9 (29 Jun 2017 12:26), Max: 98.9 (29 Jun 2017 12:26)  HR: 85 (29 Jun 2017 12:26) (78 - 85)  BP: 130/55 (29 Jun 2017 12:26) (107/68 - 150/71)  RR: 18 (29 Jun 2017 12:26) (18 - 18)  SpO2: 95% (29 Jun 2017 12:26) (93% - 95%)    Physical Exam:  Constitutional: no acute distress    Neurological: Alert & Oriented x 1,  no focal deficits    HEENT:   Neck supple.    Respiratory: Fine based crackles, LL lobe     Cardiovascular: (+) S1 & S2, RRR    Gastrointestinal: soft, NT, nondistended, (+) BS    Genitourinary: non distended bladder, voiding freely    Extremities: No pedal edema, No clubbing, No cyanosis    Skin:  Left infraclavicular site open to air, clean, dry, intact. No erythema or hematoma.           LABS:                        13.8   12.3  )-----------( 158      ( 29 Jun 2017 06:30 )             41.4     06-29    139  |  101  |  25<H>  ----------------------------<  102<H>  4.4   |  27  |  0.87    Ca    9.5      29 Jun 2017 06:30            RADIOLOGY & ADDITIONAL TESTS: XRAY: The lungs are clear. No pneumothorax.   Dual-lead pacemaker in place.      TELE: NSR 60's- 70's.

## 2017-06-29 NOTE — PHYSICAL THERAPY INITIAL EVALUATION ADULT - PERTINENT HX OF CURRENT PROBLEM, REHAB EVAL
89yoF [facility] Dementia, Parkinson's, COPD, HTN, presented to hospital 5/27/17-6/2/17 s/p syncopal episode vs seizure per daughter. Pt had holter monitor done & was found to have 2nd degree HB. Pt was at home on 6/22/17 & had witnessed episode of blank stare & unresponsiveness lasting approx 20-30 seconds. Pt was previously DNR now w/ MOLST form agreeable to tx. Recent UTI treated w/ Cipro. Cardiac biomarkers. Pt now transferred for further eval & placement of PPM.

## 2017-06-29 NOTE — PHYSICAL THERAPY INITIAL EVALUATION ADULT - ADDITIONAL COMMENTS
Pt lives in a private house w/ daughter, no steps to enter, bedroom/bathroom on first floor. As per daughter-in-law at bedside, pt uses RW to ambulate & has a HHA 8am-12pm & then another HHA 12pm-4pm 5days/wk to assist w/ ADLs.

## 2017-06-29 NOTE — PHYSICAL THERAPY INITIAL EVALUATION ADULT - ACTIVE RANGE OF MOTION EXAMINATION, REHAB EVAL
did not assess L UE shoulder flexion >90degrees/bilateral upper extremity Active ROM was WFL (within functional limits)/bilateral  lower extremity Active ROM was WFL (within functional limits)

## 2017-06-29 NOTE — PROGRESS NOTE ADULT - SUBJECTIVE AND OBJECTIVE BOX
Patient is a 89y old  Female who presents with a chief complaint of syncope (28 Jun 2017 11:23)      SUBJECTIVE / OVERNIGHT EVENTS:   Feels better.  Denies CP/SOB/Palpitation/HA.    MEDICATIONS  (STANDING):  aspirin enteric coated 81 milliGRAM(s) Oral daily  levETIRAcetam 250 milliGRAM(s) Oral two times a day  rOPINIRole 2 milliGRAM(s) Oral three times a day  QUEtiapine 25 milliGRAM(s) Oral at bedtime  carbidopa/levodopa  25/100 1.5 Tablet(s) Oral <User Schedule>  carbidopa/levodopa  25/100 1.5 Tablet(s) Oral <User Schedule>  carbidopa/levodopa  25/100 2 Tablet(s) Oral <User Schedule>  carbidopa/levodopa CR 50/200 1 Tablet(s) Oral at bedtime    MEDICATIONS  (PRN):  acetaminophen   Tablet. 650 milliGRAM(s) Oral every 6 hours PRN Mild Pain (1 - 3)      Vital Signs Last 24 Hrs  T(C): 36.7 (06-29-17 @ 21:14), Max: 37.2 (06-29-17 @ 12:26)  HR: 77 (06-29-17 @ 21:14) (77 - 85)  BP: 115/56 (06-29-17 @ 21:14) (107/68 - 130/55)  RR: 18 (06-29-17 @ 21:14) (18 - 18)  SpO2: 97% (06-29-17 @ 21:14) (93% - 97%)  CAPILLARY BLOOD GLUCOSE        I&O's Summary    28 Jun 2017 07:01  -  29 Jun 2017 07:00  --------------------------------------------------------  IN: 80 mL / OUT: 0 mL / NET: 80 mL    29 Jun 2017 07:01  -  30 Jun 2017 00:00  --------------------------------------------------------  IN: 900 mL / OUT: 0 mL / NET: 900 mL        PHYSICAL EXAM:  GENERAL: NAD, well-developed  HEAD:  Atraumatic, Normocephalic  EYES: EOMI, PERRLA, conjunctiva and sclera clear  NECK: Supple, No JVD  CHEST/LUNG: Clear to auscultation bilaterally; No wheeze  HEART: Regular rate and rhythm; No murmurs, rubs, or gallops  ABDOMEN: Soft, Nontender, Nondistended; Bowel sounds present  EXTREMITIES:  2+ Peripheral Pulses, No clubbing, cyanosis, or edema  PSYCH: AAOx3  NEUROLOGY: non-focal  SKIN: No rashes or lesions    LABS:                        13.8   12.3  )-----------( 158      ( 29 Jun 2017 06:30 )             41.4     06-29    139  |  101  |  25<H>  ----------------------------<  102<H>  4.4   |  27  |  0.87    Ca    9.5      29 Jun 2017 06:30              CAPILLARY BLOOD GLUCOSE                    RADIOLOGY & ADDITIONAL TESTS:    Imaging Personally Reviewed:    Consultant(s) Notes Reviewed:      Care Discussed with Consultants/Other Providers:

## 2017-06-29 NOTE — PHYSICAL THERAPY INITIAL EVALUATION ADULT - GAIT DEVIATIONS NOTED, PT EVAL
+festinating gait/decreased fam/decreased step length/decreased stride length/decreased weight-shifting ability

## 2017-06-30 DIAGNOSIS — G20 PARKINSON'S DISEASE: ICD-10-CM

## 2017-06-30 DIAGNOSIS — I49.5 SICK SINUS SYNDROME: ICD-10-CM

## 2017-06-30 DIAGNOSIS — F03.90 UNSPECIFIED DEMENTIA WITHOUT BEHAVIORAL DISTURBANCE: ICD-10-CM

## 2017-06-30 DIAGNOSIS — J44.9 CHRONIC OBSTRUCTIVE PULMONARY DISEASE, UNSPECIFIED: ICD-10-CM

## 2017-06-30 LAB
ANION GAP SERPL CALC-SCNC: 12 MMOL/L — SIGNIFICANT CHANGE UP (ref 5–17)
BUN SERPL-MCNC: 23 MG/DL — SIGNIFICANT CHANGE UP (ref 7–23)
CALCIUM SERPL-MCNC: 9.1 MG/DL — SIGNIFICANT CHANGE UP (ref 8.4–10.5)
CHLORIDE SERPL-SCNC: 99 MMOL/L — SIGNIFICANT CHANGE UP (ref 96–108)
CO2 SERPL-SCNC: 25 MMOL/L — SIGNIFICANT CHANGE UP (ref 22–31)
CREAT SERPL-MCNC: 0.79 MG/DL — SIGNIFICANT CHANGE UP (ref 0.5–1.3)
GLUCOSE SERPL-MCNC: 88 MG/DL — SIGNIFICANT CHANGE UP (ref 70–99)
HCT VFR BLD CALC: 36.2 % — SIGNIFICANT CHANGE UP (ref 34.5–45)
HGB BLD-MCNC: 13 G/DL — SIGNIFICANT CHANGE UP (ref 11.5–15.5)
MCHC RBC-ENTMCNC: 34 PG — SIGNIFICANT CHANGE UP (ref 27–34)
MCHC RBC-ENTMCNC: 35.8 GM/DL — SIGNIFICANT CHANGE UP (ref 32–36)
MCV RBC AUTO: 94.8 FL — SIGNIFICANT CHANGE UP (ref 80–100)
PLATELET # BLD AUTO: 135 K/UL — LOW (ref 150–400)
POTASSIUM SERPL-MCNC: 4.3 MMOL/L — SIGNIFICANT CHANGE UP (ref 3.5–5.3)
POTASSIUM SERPL-SCNC: 4.3 MMOL/L — SIGNIFICANT CHANGE UP (ref 3.5–5.3)
RBC # BLD: 3.82 M/UL — SIGNIFICANT CHANGE UP (ref 3.8–5.2)
RBC # FLD: 12 % — SIGNIFICANT CHANGE UP (ref 10.3–14.5)
SODIUM SERPL-SCNC: 136 MMOL/L — SIGNIFICANT CHANGE UP (ref 135–145)
WBC # BLD: 12.7 K/UL — HIGH (ref 3.8–10.5)
WBC # FLD AUTO: 12.7 K/UL — HIGH (ref 3.8–10.5)

## 2017-06-30 RX ADMIN — Medication 650 MILLIGRAM(S): at 13:48

## 2017-06-30 RX ADMIN — Medication 650 MILLIGRAM(S): at 11:47

## 2017-06-30 RX ADMIN — ROPINIROLE 2 MILLIGRAM(S): 8 TABLET, FILM COATED, EXTENDED RELEASE ORAL at 13:48

## 2017-06-30 RX ADMIN — ROPINIROLE 2 MILLIGRAM(S): 8 TABLET, FILM COATED, EXTENDED RELEASE ORAL at 22:24

## 2017-06-30 RX ADMIN — LEVETIRACETAM 250 MILLIGRAM(S): 250 TABLET, FILM COATED ORAL at 06:34

## 2017-06-30 RX ADMIN — ROPINIROLE 2 MILLIGRAM(S): 8 TABLET, FILM COATED, EXTENDED RELEASE ORAL at 06:34

## 2017-06-30 RX ADMIN — CARBIDOPA AND LEVODOPA 1.5 TABLET(S): 25; 100 TABLET ORAL at 11:42

## 2017-06-30 RX ADMIN — CARBIDOPA AND LEVODOPA 2 TABLET(S): 25; 100 TABLET ORAL at 17:01

## 2017-06-30 RX ADMIN — QUETIAPINE FUMARATE 25 MILLIGRAM(S): 200 TABLET, FILM COATED ORAL at 22:25

## 2017-06-30 RX ADMIN — CARBIDOPA AND LEVODOPA 1.5 TABLET(S): 25; 100 TABLET ORAL at 06:35

## 2017-06-30 RX ADMIN — LEVETIRACETAM 250 MILLIGRAM(S): 250 TABLET, FILM COATED ORAL at 17:01

## 2017-06-30 RX ADMIN — CARBIDOPA AND LEVODOPA 1 TABLET(S): 25; 100 TABLET ORAL at 22:24

## 2017-06-30 RX ADMIN — Medication 81 MILLIGRAM(S): at 11:42

## 2017-06-30 NOTE — PROGRESS NOTE ADULT - SUBJECTIVE AND OBJECTIVE BOX
Patient is a 89y old  Female who presents with a chief complaint of syncope (28 Jun 2017 11:23)      SUBJECTIVE / OVERNIGHT EVENTS:   Feels better.  Denies CP/SOB/Palpitation/HA.    MEDICATIONS  (STANDING):  aspirin enteric coated 81 milliGRAM(s) Oral daily  levETIRAcetam 250 milliGRAM(s) Oral two times a day  rOPINIRole 2 milliGRAM(s) Oral three times a day  QUEtiapine 25 milliGRAM(s) Oral at bedtime  carbidopa/levodopa  25/100 1.5 Tablet(s) Oral <User Schedule>  carbidopa/levodopa  25/100 1.5 Tablet(s) Oral <User Schedule>  carbidopa/levodopa  25/100 2 Tablet(s) Oral <User Schedule>  carbidopa/levodopa CR 50/200 1 Tablet(s) Oral at bedtime    MEDICATIONS  (PRN):  acetaminophen   Tablet. 650 milliGRAM(s) Oral every 6 hours PRN Mild Pain (1 - 3)      Vital Signs Last 24 Hrs  T(C): 36.7 (06-30-17 @ 12:40), Max: 36.7 (06-29-17 @ 21:14)  HR: 81 (06-30-17 @ 12:40) (76 - 81)  BP: 101/55 (06-30-17 @ 12:40) (101/55 - 115/56)  RR: 18 (06-30-17 @ 12:40) (18 - 18)  SpO2: 97% (06-30-17 @ 12:40) (95% - 97%)  CAPILLARY BLOOD GLUCOSE        I&O's Summary    29 Jun 2017 07:01  -  30 Jun 2017 07:00  --------------------------------------------------------  IN: 900 mL / OUT: 0 mL / NET: 900 mL    30 Jun 2017 07:01  -  30 Jun 2017 18:26  --------------------------------------------------------  IN: 300 mL / OUT: 0 mL / NET: 300 mL        PHYSICAL EXAM:  GENERAL: NAD, well-developed  HEAD:  Atraumatic, Normocephalic  EYES: EOMI, PERRLA, conjunctiva and sclera clear  NECK: Supple, No JVD  CHEST/LUNG: Clear to auscultation bilaterally; No wheeze  HEART: Regular rate and rhythm; No murmurs, rubs, or gallops  ABDOMEN: Soft, Nontender, Nondistended; Bowel sounds present  EXTREMITIES:  2+ Peripheral Pulses, No clubbing, cyanosis, or edema  PSYCH: AAOx3  NEUROLOGY: non-focal  SKIN: No rashes or lesions    LABS:                        13.0   12.7  )-----------( 135      ( 30 Jun 2017 06:50 )             36.2     06-30    136  |  99  |  23  ----------------------------<  88  4.3   |  25  |  0.79    Ca    9.1      30 Jun 2017 06:50              CAPILLARY BLOOD GLUCOSE                    RADIOLOGY & ADDITIONAL TESTS:    Imaging Personally Reviewed:    Consultant(s) Notes Reviewed:      Care Discussed with Consultants/Other Providers:

## 2017-07-01 LAB
ANION GAP SERPL CALC-SCNC: 9 MMOL/L — SIGNIFICANT CHANGE UP (ref 5–17)
BUN SERPL-MCNC: 26 MG/DL — HIGH (ref 7–23)
CALCIUM SERPL-MCNC: 9.1 MG/DL — SIGNIFICANT CHANGE UP (ref 8.4–10.5)
CHLORIDE SERPL-SCNC: 101 MMOL/L — SIGNIFICANT CHANGE UP (ref 96–108)
CO2 SERPL-SCNC: 28 MMOL/L — SIGNIFICANT CHANGE UP (ref 22–31)
CREAT SERPL-MCNC: 0.91 MG/DL — SIGNIFICANT CHANGE UP (ref 0.5–1.3)
GLUCOSE SERPL-MCNC: 90 MG/DL — SIGNIFICANT CHANGE UP (ref 70–99)
HCT VFR BLD CALC: 34.5 % — SIGNIFICANT CHANGE UP (ref 34.5–45)
HGB BLD-MCNC: 12.1 G/DL — SIGNIFICANT CHANGE UP (ref 11.5–15.5)
MCHC RBC-ENTMCNC: 33.6 PG — SIGNIFICANT CHANGE UP (ref 27–34)
MCHC RBC-ENTMCNC: 35.1 GM/DL — SIGNIFICANT CHANGE UP (ref 32–36)
MCV RBC AUTO: 95.5 FL — SIGNIFICANT CHANGE UP (ref 80–100)
PLATELET # BLD AUTO: 146 K/UL — LOW (ref 150–400)
POTASSIUM SERPL-MCNC: 4.6 MMOL/L — SIGNIFICANT CHANGE UP (ref 3.5–5.3)
POTASSIUM SERPL-SCNC: 4.6 MMOL/L — SIGNIFICANT CHANGE UP (ref 3.5–5.3)
RBC # BLD: 3.61 M/UL — LOW (ref 3.8–5.2)
RBC # FLD: 11.9 % — SIGNIFICANT CHANGE UP (ref 10.3–14.5)
SODIUM SERPL-SCNC: 138 MMOL/L — SIGNIFICANT CHANGE UP (ref 135–145)
WBC # BLD: 10.7 K/UL — HIGH (ref 3.8–10.5)
WBC # FLD AUTO: 10.7 K/UL — HIGH (ref 3.8–10.5)

## 2017-07-01 RX ADMIN — LEVETIRACETAM 250 MILLIGRAM(S): 250 TABLET, FILM COATED ORAL at 17:37

## 2017-07-01 RX ADMIN — Medication 81 MILLIGRAM(S): at 12:37

## 2017-07-01 RX ADMIN — CARBIDOPA AND LEVODOPA 1 TABLET(S): 25; 100 TABLET ORAL at 21:33

## 2017-07-01 RX ADMIN — ROPINIROLE 2 MILLIGRAM(S): 8 TABLET, FILM COATED, EXTENDED RELEASE ORAL at 06:52

## 2017-07-01 RX ADMIN — LEVETIRACETAM 250 MILLIGRAM(S): 250 TABLET, FILM COATED ORAL at 06:52

## 2017-07-01 RX ADMIN — ROPINIROLE 2 MILLIGRAM(S): 8 TABLET, FILM COATED, EXTENDED RELEASE ORAL at 21:33

## 2017-07-01 RX ADMIN — ROPINIROLE 2 MILLIGRAM(S): 8 TABLET, FILM COATED, EXTENDED RELEASE ORAL at 13:21

## 2017-07-01 RX ADMIN — CARBIDOPA AND LEVODOPA 1.5 TABLET(S): 25; 100 TABLET ORAL at 12:37

## 2017-07-01 RX ADMIN — CARBIDOPA AND LEVODOPA 2 TABLET(S): 25; 100 TABLET ORAL at 17:36

## 2017-07-01 RX ADMIN — CARBIDOPA AND LEVODOPA 1.5 TABLET(S): 25; 100 TABLET ORAL at 06:52

## 2017-07-01 RX ADMIN — QUETIAPINE FUMARATE 25 MILLIGRAM(S): 200 TABLET, FILM COATED ORAL at 21:33

## 2017-07-01 NOTE — PROGRESS NOTE ADULT - SUBJECTIVE AND OBJECTIVE BOX
Patient is a 89y old  Female who presents with a chief complaint of syncope (28 Jun 2017 11:23)      SUBJECTIVE / OVERNIGHT EVENTS:   Feels better.  Denies CP/SOB/Palpitation/HA.    MEDICATIONS  (STANDING):  aspirin enteric coated 81 milliGRAM(s) Oral daily  levETIRAcetam 250 milliGRAM(s) Oral two times a day  rOPINIRole 2 milliGRAM(s) Oral three times a day  QUEtiapine 25 milliGRAM(s) Oral at bedtime  carbidopa/levodopa  25/100 1.5 Tablet(s) Oral <User Schedule>  carbidopa/levodopa  25/100 1.5 Tablet(s) Oral <User Schedule>  carbidopa/levodopa  25/100 2 Tablet(s) Oral <User Schedule>  carbidopa/levodopa CR 50/200 1 Tablet(s) Oral at bedtime    MEDICATIONS  (PRN):  acetaminophen   Tablet. 650 milliGRAM(s) Oral every 6 hours PRN Mild Pain (1 - 3)      Vital Signs Last 24 Hrs  T(C): 37.1 (07-01-17 @ 13:20), Max: 37.1 (07-01-17 @ 13:20)  HR: 81 (07-01-17 @ 13:20) (69 - 83)  BP: 113/68 (07-01-17 @ 13:20) (99/64 - 126/61)  RR: 18 (07-01-17 @ 13:20) (18 - 18)  SpO2: 95% (07-01-17 @ 13:20) (95% - 98%)  CAPILLARY BLOOD GLUCOSE        I&O's Summary    30 Jun 2017 07:01 - 01 Jul 2017 07:00  --------------------------------------------------------  IN: 540 mL / OUT: 0 mL / NET: 540 mL    01 Jul 2017 07:01  -  01 Jul 2017 19:40  --------------------------------------------------------  IN: 600 mL / OUT: 0 mL / NET: 600 mL        PHYSICAL EXAM:  GENERAL: NAD, well-developed  HEAD:  Atraumatic, Normocephalic  EYES: EOMI, PERRLA, conjunctiva and sclera clear  NECK: Supple, No JVD  CHEST/LUNG: Clear to auscultation bilaterally; No wheeze  HEART: Regular rate and rhythm; No murmurs, rubs, or gallops  ABDOMEN: Soft, Nontender, Nondistended; Bowel sounds present  EXTREMITIES:  2+ Peripheral Pulses, No clubbing, cyanosis, or edema  PSYCH: AAOx3  NEUROLOGY: non-focal  SKIN: No rashes or lesions    LABS:                        12.1   10.7  )-----------( 146      ( 01 Jul 2017 07:20 )             34.5     07-01    138  |  101  |  26<H>  ----------------------------<  90  4.6   |  28  |  0.91    Ca    9.1      01 Jul 2017 07:20              CAPILLARY BLOOD GLUCOSE                    RADIOLOGY & ADDITIONAL TESTS:    Imaging Personally Reviewed:    Consultant(s) Notes Reviewed:      Care Discussed with Consultants/Other Providers:

## 2017-07-02 LAB
ANION GAP SERPL CALC-SCNC: 12 MMOL/L — SIGNIFICANT CHANGE UP (ref 5–17)
BUN SERPL-MCNC: 24 MG/DL — HIGH (ref 7–23)
CALCIUM SERPL-MCNC: 8.9 MG/DL — SIGNIFICANT CHANGE UP (ref 8.4–10.5)
CHLORIDE SERPL-SCNC: 102 MMOL/L — SIGNIFICANT CHANGE UP (ref 96–108)
CO2 SERPL-SCNC: 26 MMOL/L — SIGNIFICANT CHANGE UP (ref 22–31)
CREAT SERPL-MCNC: 0.79 MG/DL — SIGNIFICANT CHANGE UP (ref 0.5–1.3)
GLUCOSE SERPL-MCNC: 87 MG/DL — SIGNIFICANT CHANGE UP (ref 70–99)
HCT VFR BLD CALC: 35.5 % — SIGNIFICANT CHANGE UP (ref 34.5–45)
HGB BLD-MCNC: 11.7 G/DL — SIGNIFICANT CHANGE UP (ref 11.5–15.5)
MCHC RBC-ENTMCNC: 31.1 PG — SIGNIFICANT CHANGE UP (ref 27–34)
MCHC RBC-ENTMCNC: 32.9 GM/DL — SIGNIFICANT CHANGE UP (ref 32–36)
MCV RBC AUTO: 94.6 FL — SIGNIFICANT CHANGE UP (ref 80–100)
PLATELET # BLD AUTO: 170 K/UL — SIGNIFICANT CHANGE UP (ref 150–400)
POTASSIUM SERPL-MCNC: 3.9 MMOL/L — SIGNIFICANT CHANGE UP (ref 3.5–5.3)
POTASSIUM SERPL-SCNC: 3.9 MMOL/L — SIGNIFICANT CHANGE UP (ref 3.5–5.3)
RBC # BLD: 3.75 M/UL — LOW (ref 3.8–5.2)
RBC # FLD: 11.9 % — SIGNIFICANT CHANGE UP (ref 10.3–14.5)
SODIUM SERPL-SCNC: 140 MMOL/L — SIGNIFICANT CHANGE UP (ref 135–145)
WBC # BLD: 10.7 K/UL — HIGH (ref 3.8–10.5)
WBC # FLD AUTO: 10.7 K/UL — HIGH (ref 3.8–10.5)

## 2017-07-02 RX ADMIN — Medication 81 MILLIGRAM(S): at 13:07

## 2017-07-02 RX ADMIN — ROPINIROLE 2 MILLIGRAM(S): 8 TABLET, FILM COATED, EXTENDED RELEASE ORAL at 06:22

## 2017-07-02 RX ADMIN — LEVETIRACETAM 250 MILLIGRAM(S): 250 TABLET, FILM COATED ORAL at 06:22

## 2017-07-02 RX ADMIN — CARBIDOPA AND LEVODOPA 1.5 TABLET(S): 25; 100 TABLET ORAL at 06:22

## 2017-07-02 RX ADMIN — ROPINIROLE 2 MILLIGRAM(S): 8 TABLET, FILM COATED, EXTENDED RELEASE ORAL at 22:03

## 2017-07-02 RX ADMIN — QUETIAPINE FUMARATE 25 MILLIGRAM(S): 200 TABLET, FILM COATED ORAL at 22:03

## 2017-07-02 RX ADMIN — CARBIDOPA AND LEVODOPA 1 TABLET(S): 25; 100 TABLET ORAL at 22:03

## 2017-07-02 RX ADMIN — CARBIDOPA AND LEVODOPA 2 TABLET(S): 25; 100 TABLET ORAL at 18:05

## 2017-07-02 RX ADMIN — LEVETIRACETAM 250 MILLIGRAM(S): 250 TABLET, FILM COATED ORAL at 18:05

## 2017-07-02 RX ADMIN — ROPINIROLE 2 MILLIGRAM(S): 8 TABLET, FILM COATED, EXTENDED RELEASE ORAL at 13:08

## 2017-07-02 RX ADMIN — CARBIDOPA AND LEVODOPA 1.5 TABLET(S): 25; 100 TABLET ORAL at 13:08

## 2017-07-02 NOTE — PROGRESS NOTE ADULT - SUBJECTIVE AND OBJECTIVE BOX
Patient is a 89y old  Female who presents with a chief complaint of syncope (28 Jun 2017 11:23)      SUBJECTIVE / OVERNIGHT EVENTS:   Feels better.  Denies CP/SOB/Palpitation/HA.    MEDICATIONS  (STANDING):  aspirin enteric coated 81 milliGRAM(s) Oral daily  levETIRAcetam 250 milliGRAM(s) Oral two times a day  rOPINIRole 2 milliGRAM(s) Oral three times a day  QUEtiapine 25 milliGRAM(s) Oral at bedtime  carbidopa/levodopa  25/100 1.5 Tablet(s) Oral <User Schedule>  carbidopa/levodopa  25/100 1.5 Tablet(s) Oral <User Schedule>  carbidopa/levodopa  25/100 2 Tablet(s) Oral <User Schedule>  carbidopa/levodopa CR 50/200 1 Tablet(s) Oral at bedtime    MEDICATIONS  (PRN):  acetaminophen   Tablet. 650 milliGRAM(s) Oral every 6 hours PRN Mild Pain (1 - 3)      Vital Signs Last 24 Hrs  T(C): 36.9 (07-02-17 @ 21:11), Max: 37.2 (07-02-17 @ 04:42)  HR: 81 (07-02-17 @ 21:11) (69 - 81)  BP: 119/55 (07-02-17 @ 21:11) (100/64 - 121/54)  RR: 18 (07-02-17 @ 21:11) (18 - 18)  SpO2: 95% (07-02-17 @ 21:11) (95% - 96%)  CAPILLARY BLOOD GLUCOSE        I&O's Summary    01 Jul 2017 07:01  -  02 Jul 2017 07:00  --------------------------------------------------------  IN: 780 mL / OUT: 0 mL / NET: 780 mL    02 Jul 2017 07:01  -  03 Jul 2017 00:14  --------------------------------------------------------  IN: 1080 mL / OUT: 0 mL / NET: 1080 mL        PHYSICAL EXAM:  GENERAL: NAD, well-developed  HEAD:  Atraumatic, Normocephalic  EYES: EOMI, PERRLA, conjunctiva and sclera clear  NECK: Supple, No JVD  CHEST/LUNG: Clear to auscultation bilaterally; No wheeze  HEART: Regular rate and rhythm; No murmurs, rubs, or gallops  ABDOMEN: Soft, Nontender, Nondistended; Bowel sounds present  EXTREMITIES:  2+ Peripheral Pulses, No clubbing, cyanosis, or edema  PSYCH: AAOx3  NEUROLOGY: non-focal  SKIN: No rashes or lesions    LABS:                        11.7   10.7  )-----------( 170      ( 02 Jul 2017 07:18 )             35.5     07-02    140  |  102  |  24<H>  ----------------------------<  87  3.9   |  26  |  0.79    Ca    8.9      02 Jul 2017 07:18              CAPILLARY BLOOD GLUCOSE                    RADIOLOGY & ADDITIONAL TESTS:    Imaging Personally Reviewed:    Consultant(s) Notes Reviewed:      Care Discussed with Consultants/Other Providers:

## 2017-07-03 RX ORDER — ACETAMINOPHEN 500 MG
2 TABLET ORAL
Qty: 0 | Refills: 0 | DISCHARGE
Start: 2017-07-03

## 2017-07-03 RX ADMIN — Medication 650 MILLIGRAM(S): at 17:17

## 2017-07-03 RX ADMIN — ROPINIROLE 2 MILLIGRAM(S): 8 TABLET, FILM COATED, EXTENDED RELEASE ORAL at 21:34

## 2017-07-03 RX ADMIN — ROPINIROLE 2 MILLIGRAM(S): 8 TABLET, FILM COATED, EXTENDED RELEASE ORAL at 05:42

## 2017-07-03 RX ADMIN — Medication 81 MILLIGRAM(S): at 11:38

## 2017-07-03 RX ADMIN — CARBIDOPA AND LEVODOPA 1.5 TABLET(S): 25; 100 TABLET ORAL at 11:38

## 2017-07-03 RX ADMIN — LEVETIRACETAM 250 MILLIGRAM(S): 250 TABLET, FILM COATED ORAL at 05:43

## 2017-07-03 RX ADMIN — CARBIDOPA AND LEVODOPA 2 TABLET(S): 25; 100 TABLET ORAL at 17:14

## 2017-07-03 RX ADMIN — QUETIAPINE FUMARATE 25 MILLIGRAM(S): 200 TABLET, FILM COATED ORAL at 21:34

## 2017-07-03 RX ADMIN — ROPINIROLE 2 MILLIGRAM(S): 8 TABLET, FILM COATED, EXTENDED RELEASE ORAL at 13:55

## 2017-07-03 RX ADMIN — CARBIDOPA AND LEVODOPA 1.5 TABLET(S): 25; 100 TABLET ORAL at 05:42

## 2017-07-03 RX ADMIN — CARBIDOPA AND LEVODOPA 1 TABLET(S): 25; 100 TABLET ORAL at 21:34

## 2017-07-03 RX ADMIN — Medication 650 MILLIGRAM(S): at 16:08

## 2017-07-03 RX ADMIN — LEVETIRACETAM 250 MILLIGRAM(S): 250 TABLET, FILM COATED ORAL at 17:14

## 2017-07-03 NOTE — PROGRESS NOTE ADULT - PROBLEM SELECTOR PROBLEM 2
COPD (chronic obstructive pulmonary disease)

## 2017-07-03 NOTE — PROGRESS NOTE ADULT - SUBJECTIVE AND OBJECTIVE BOX
INTERVAL HPI/OVERNIGHT EVENTS: Pleasantly confused slight left arm tenderness. Family at bedside. No overnight events.     MEDICATIONS  (STANDING):  aspirin enteric coated 81 milliGRAM(s) Oral daily  levETIRAcetam 250 milliGRAM(s) Oral two times a day  rOPINIRole 2 milliGRAM(s) Oral three times a day  QUEtiapine 25 milliGRAM(s) Oral at bedtime  carbidopa/levodopa  25/100 1.5 Tablet(s) Oral <User Schedule>  carbidopa/levodopa  25/100 1.5 Tablet(s) Oral <User Schedule>  carbidopa/levodopa  25/100 2 Tablet(s) Oral <User Schedule>  carbidopa/levodopa CR 50/200 1 Tablet(s) Oral at bedtime    MEDICATIONS  (PRN):  acetaminophen   Tablet. 650 milliGRAM(s) Oral every 6 hours PRN Mild Pain (1 - 3)      Allergies    No Known Allergies    Intolerances    Namenda (Other (Severe))    ROS:  General: Pt denies fever/chills    Cardiovascular: denies chest pain/palpitations/leg edema    Respiratory and Thorax: denies SOB/cough/wheezing    Gastrointestinal: denies abdominal pain/diarrhea/constipation/bloody stool    Musculoskeletal: + left arm tenderness    Skin: denies rashes/sores      Vital Signs Last 24 Hrs  T(C): 36.5 (03 Jul 2017 04:51), Max: 37.1 (02 Jul 2017 12:30)  T(F): 97.7 (03 Jul 2017 04:51), Max: 98.7 (02 Jul 2017 12:30)  HR: 69 (03 Jul 2017 04:51) (69 - 81)  BP: 108/51 (03 Jul 2017 04:51) (100/64 - 119/55)  BP(mean): --  RR: 18 (03 Jul 2017 04:51) (18 - 18)  SpO2: 95% (03 Jul 2017 04:51) (95% - 95%)  Physical Exam:  Vital Signs : BP        HR       RR    Constitutional: well developed, well nourished, no deformities and no acute distress    Neurological: Alert & Oriented x 3, LUNA, no focal deficits    HEENT: NC/AT, PERRLA, EOMI,  Neck supple.    Respiratory: CTA B/L, No wheezing/crackles/rhonchi    Cardiovascular: (+) S1 & S2, RRR, No m/r/g    Gastrointestinal: soft, NT, nondistended, (+) BS    Genitourinary: non distended bladder, voiding freely    Extremities: No pedal edema, No clubbing, No cyanosis    Skin:  Left infraclavicular site no bleeding or swelling noted.             LABS:                        11.7   10.7  )-----------( 170      ( 02 Jul 2017 07:18 )             35.5     07-02    140  |  102  |  24<H>  ----------------------------<  87  3.9   |  26  |  0.79    Ca    8.9      02 Jul 2017 07:18            RADIOLOGY & ADDITIONAL TESTS:    TELE: SR 80- 90's INTERVAL HPI/OVERNIGHT EVENTS: Pleasantly confused slight left arm tenderness. Family at bedside. No overnight events.     MEDICATIONS  (STANDING):  aspirin enteric coated 81 milliGRAM(s) Oral daily  levETIRAcetam 250 milliGRAM(s) Oral two times a day  rOPINIRole 2 milliGRAM(s) Oral three times a day  QUEtiapine 25 milliGRAM(s) Oral at bedtime  carbidopa/levodopa  25/100 1.5 Tablet(s) Oral <User Schedule>  carbidopa/levodopa  25/100 1.5 Tablet(s) Oral <User Schedule>  carbidopa/levodopa  25/100 2 Tablet(s) Oral <User Schedule>  carbidopa/levodopa CR 50/200 1 Tablet(s) Oral at bedtime    MEDICATIONS  (PRN):  acetaminophen   Tablet. 650 milliGRAM(s) Oral every 6 hours PRN Mild Pain (1 - 3)      Allergies    No Known Allergies    Intolerances    Namenda (Other (Severe))    ROS:  General: Pt denies fever/chills    Cardiovascular: denies chest pain/palpitations/leg edema    Respiratory and Thorax: denies SOB/cough/wheezing    Gastrointestinal: denies abdominal pain/diarrhea/constipation/bloody stool    Musculoskeletal: + left arm tenderness    Skin: denies rashes/sores      Vital Signs Last 24 Hrs  T(C): 36.5 (03 Jul 2017 04:51), Max: 37.1 (02 Jul 2017 12:30)  T(F): 97.7 (03 Jul 2017 04:51), Max: 98.7 (02 Jul 2017 12:30)  HR: 69 (03 Jul 2017 04:51) (69 - 81)  BP: 108/51 (03 Jul 2017 04:51) (100/64 - 119/55)  BP(mean): --  RR: 18 (03 Jul 2017 04:51) (18 - 18)  SpO2: 95% (03 Jul 2017 04:51) (95% - 95%)  Physical Exam:  Vital Signs : BP        HR       RR    Constitutional: well developed, well nourished, no deformities and no acute distress    Neurological: Alert & Oriented x 3, LUNA, no focal deficits    HEENT: NC/AT, PERRLA, EOMI,  Neck supple.    Respiratory: CTA B/L, No wheezing/crackles/rhonchi    Cardiovascular: (+) S1 & S2, RRR, No m/r/g    Gastrointestinal: soft, NT, nondistended, (+) BS    Genitourinary: non distended bladder, voiding freely    Extremities: No pedal edema, No clubbing, No cyanosis    Skin:  Left infraclavicular site no bleeding or swelling noted.             LABS:                        11.7   10.7  )-----------( 170      ( 02 Jul 2017 07:18 )             35.5     07-02    140  |  102  |  24<H>  ----------------------------<  87  3.9   |  26  |  0.79    Ca    8.9      02 Jul 2017 07:18            RADIOLOGY & ADDITIONAL TESTS:    TELE: SR 80- 90's  Chest X-ray: No PTX, lead tips in place

## 2017-07-03 NOTE — PROGRESS NOTE ADULT - SUBJECTIVE AND OBJECTIVE BOX
Patient is a 89y old  Female who presents with a chief complaint of syncope (28 Jun 2017 11:23)      SUBJECTIVE / OVERNIGHT EVENTS:   Feels better.  Denies CP/SOB/Palpitation/HA.    MEDICATIONS  (STANDING):  aspirin enteric coated 81 milliGRAM(s) Oral daily  levETIRAcetam 250 milliGRAM(s) Oral two times a day  rOPINIRole 2 milliGRAM(s) Oral three times a day  QUEtiapine 25 milliGRAM(s) Oral at bedtime  carbidopa/levodopa  25/100 1.5 Tablet(s) Oral <User Schedule>  carbidopa/levodopa  25/100 1.5 Tablet(s) Oral <User Schedule>  carbidopa/levodopa  25/100 2 Tablet(s) Oral <User Schedule>  carbidopa/levodopa CR 50/200 1 Tablet(s) Oral at bedtime    MEDICATIONS  (PRN):  acetaminophen   Tablet. 650 milliGRAM(s) Oral every 6 hours PRN Mild Pain (1 - 3)      Vital Signs Last 24 Hrs  T(C): 36.6 (07-03-17 @ 14:08), Max: 36.9 (07-02-17 @ 21:11)  HR: 82 (07-03-17 @ 14:08) (69 - 82)  BP: 120/70 (07-03-17 @ 14:08) (108/51 - 120/70)  RR: 18 (07-03-17 @ 04:51) (18 - 18)  SpO2: 92% (07-03-17 @ 14:08) (92% - 95%)  CAPILLARY BLOOD GLUCOSE        I&O's Summary    02 Jul 2017 07:01  -  03 Jul 2017 07:00  --------------------------------------------------------  IN: 1170 mL / OUT: 0 mL / NET: 1170 mL    03 Jul 2017 07:01  -  03 Jul 2017 17:02  --------------------------------------------------------  IN: 360 mL / OUT: 0 mL / NET: 360 mL        PHYSICAL EXAM:  GENERAL: NAD, well-developed  HEAD:  Atraumatic, Normocephalic  EYES: EOMI, PERRLA, conjunctiva and sclera clear  NECK: Supple, No JVD  CHEST/LUNG: Clear to auscultation bilaterally; No wheeze  HEART: Regular rate and rhythm; No murmurs, rubs, or gallops  ABDOMEN: Soft, Nontender, Nondistended; Bowel sounds present  EXTREMITIES:  2+ Peripheral Pulses, No clubbing, cyanosis, or edema  PSYCH: AAOx3  NEUROLOGY: non-focal  SKIN: No rashes or lesions    LABS:                        11.7   10.7  )-----------( 170      ( 02 Jul 2017 07:18 )             35.5     07-02    140  |  102  |  24<H>  ----------------------------<  87  3.9   |  26  |  0.79    Ca    8.9      02 Jul 2017 07:18              CAPILLARY BLOOD GLUCOSE                    RADIOLOGY & ADDITIONAL TESTS:    Imaging Personally Reviewed:    Consultant(s) Notes Reviewed:      Care Discussed with Consultants/Other Providers:

## 2017-07-03 NOTE — PROGRESS NOTE ADULT - PROBLEM SELECTOR PLAN 4
Sharon Patterson pt's daughter. DC planning to [facility]. Medically stable.
Seroquel

## 2017-07-03 NOTE — PROGRESS NOTE ADULT - ASSESSMENT
89 yo female with PMHX significant for Dementia, Parkinson's, COPD, HTN, seizures, syncope. 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 S/P PPM  implantation on 6/27.

## 2017-07-03 NOTE — PROGRESS NOTE ADULT - PROBLEM SELECTOR PLAN 1
Post device teaching reinforced with patient family   S/P PT evaluation recommending Rehab placement   Tylenol PRN mild pain. Post device teaching reinforced with patient and family   Awaiting Rehab placement. F/U with EP in 7-10 days for wound check appt.   Tylenol PRN mild pain.

## 2017-07-04 RX ADMIN — LEVETIRACETAM 250 MILLIGRAM(S): 250 TABLET, FILM COATED ORAL at 17:48

## 2017-07-04 RX ADMIN — ROPINIROLE 2 MILLIGRAM(S): 8 TABLET, FILM COATED, EXTENDED RELEASE ORAL at 22:14

## 2017-07-04 RX ADMIN — Medication 650 MILLIGRAM(S): at 10:08

## 2017-07-04 RX ADMIN — CARBIDOPA AND LEVODOPA 2 TABLET(S): 25; 100 TABLET ORAL at 17:48

## 2017-07-04 RX ADMIN — ROPINIROLE 2 MILLIGRAM(S): 8 TABLET, FILM COATED, EXTENDED RELEASE ORAL at 13:40

## 2017-07-04 RX ADMIN — QUETIAPINE FUMARATE 25 MILLIGRAM(S): 200 TABLET, FILM COATED ORAL at 22:14

## 2017-07-04 RX ADMIN — CARBIDOPA AND LEVODOPA 1 TABLET(S): 25; 100 TABLET ORAL at 22:14

## 2017-07-04 RX ADMIN — CARBIDOPA AND LEVODOPA 1.5 TABLET(S): 25; 100 TABLET ORAL at 05:28

## 2017-07-04 RX ADMIN — ROPINIROLE 2 MILLIGRAM(S): 8 TABLET, FILM COATED, EXTENDED RELEASE ORAL at 05:28

## 2017-07-04 RX ADMIN — CARBIDOPA AND LEVODOPA 1.5 TABLET(S): 25; 100 TABLET ORAL at 11:32

## 2017-07-04 RX ADMIN — Medication 81 MILLIGRAM(S): at 11:32

## 2017-07-04 RX ADMIN — Medication 650 MILLIGRAM(S): at 13:34

## 2017-07-04 RX ADMIN — LEVETIRACETAM 250 MILLIGRAM(S): 250 TABLET, FILM COATED ORAL at 05:28

## 2017-07-04 NOTE — PROGRESS NOTE ADULT - SUBJECTIVE AND OBJECTIVE BOX
MEDICINE, PROGRESS NOTE [phone]    JOEL ORTIZ 89y [id number]    Patient seen and examined.  Patient is a 89y old  Female who presents with a chief complaint of syncope (28 Jun 2017 11:23)      PAST MEDICAL & SURGICAL HISTORY:  Former smoker  Dysphagia  Sick sinus syndrome  Syncope  Dementia  Parkinsons  COPD (chronic obstructive pulmonary disease)  History of cholecystectomy  History of cholecystectomy    MEDICATIONS  (STANDING):  aspirin enteric coated 81 milliGRAM(s) Oral daily  levETIRAcetam 250 milliGRAM(s) Oral two times a day  rOPINIRole 2 milliGRAM(s) Oral three times a day  QUEtiapine 25 milliGRAM(s) Oral at bedtime  carbidopa/levodopa  25/100 1.5 Tablet(s) Oral <User Schedule>  carbidopa/levodopa  25/100 1.5 Tablet(s) Oral <User Schedule>  carbidopa/levodopa  25/100 2 Tablet(s) Oral <User Schedule>  carbidopa/levodopa CR 50/200 1 Tablet(s) Oral at bedtime    MEDICATIONS  (PRN):  acetaminophen   Tablet. 650 milliGRAM(s) Oral every 6 hours PRN Mild Pain (1 - 3)    Allergies    No Known Allergies    Intolerances    Namenda (Other (Severe))      PHYSICAL EXAM:  Constitutional: NAD  HEENT: Normocephalic, EOMI  Neck:  No JVD  Respiratory: CTA B/L, No wheezes  Cardiovascular: S1, S2, RRR, + systolic murmur  Gastrointestinal: BS+, soft, NT/ND  Extremities: No peripheral edema  Neurological: AAOX1, no focal deficits  Psychiatric: Normal mood, normal affect  : No Ponce    Vital Signs Last 24 Hrs  T(C): 36.7 (04 Jul 2017 14:12), Max: 36.8 (04 Jul 2017 04:02)  T(F): 98.1 (04 Jul 2017 14:12), Max: 98.2 (04 Jul 2017 04:02)  HR: 79 (04 Jul 2017 14:12) (63 - 79)  BP: 126/74 (04 Jul 2017 14:12) (113/71 - 132/83)  BP(mean): --  RR: 18 (04 Jul 2017 14:12) (18 - 18)  SpO2: 93% (04 Jul 2017 14:12) (93% - 96%)  I&O's Summary    03 Jul 2017 07:01  -  04 Jul 2017 07:00  --------------------------------------------------------  IN: 780 mL / OUT: 0 mL / NET: 780 mL    04 Jul 2017 07:01  -  04 Jul 2017 16:37  --------------------------------------------------------  IN: 480 mL / OUT: 0 mL / NET: 480 mL        LABS:                    REVIEW OF SYSTEMS:      RADIOLOGY & ADDITIONAL STUDIES:      ASSESSMENT/PLAN:

## 2017-07-05 VITALS
DIASTOLIC BLOOD PRESSURE: 55 MMHG | SYSTOLIC BLOOD PRESSURE: 98 MMHG | HEART RATE: 76 BPM | TEMPERATURE: 98 F | RESPIRATION RATE: 18 BRPM | OXYGEN SATURATION: 95 %

## 2017-07-05 PROCEDURE — C1898: CPT

## 2017-07-05 PROCEDURE — 97161 PT EVAL LOW COMPLEX 20 MIN: CPT

## 2017-07-05 PROCEDURE — C1785: CPT

## 2017-07-05 PROCEDURE — 97116 GAIT TRAINING THERAPY: CPT

## 2017-07-05 PROCEDURE — 97530 THERAPEUTIC ACTIVITIES: CPT

## 2017-07-05 PROCEDURE — 97110 THERAPEUTIC EXERCISES: CPT

## 2017-07-05 PROCEDURE — C1892: CPT

## 2017-07-05 PROCEDURE — 80048 BASIC METABOLIC PNL TOTAL CA: CPT

## 2017-07-05 PROCEDURE — 71045 X-RAY EXAM CHEST 1 VIEW: CPT

## 2017-07-05 PROCEDURE — 33208 INSRT HEART PM ATRIAL & VENT: CPT | Mod: KX

## 2017-07-05 PROCEDURE — 93005 ELECTROCARDIOGRAM TRACING: CPT

## 2017-07-05 PROCEDURE — 85027 COMPLETE CBC AUTOMATED: CPT

## 2017-07-05 PROCEDURE — C1769: CPT

## 2017-07-05 RX ADMIN — CARBIDOPA AND LEVODOPA 1.5 TABLET(S): 25; 100 TABLET ORAL at 09:14

## 2017-07-05 RX ADMIN — ROPINIROLE 2 MILLIGRAM(S): 8 TABLET, FILM COATED, EXTENDED RELEASE ORAL at 05:37

## 2017-07-05 RX ADMIN — ROPINIROLE 2 MILLIGRAM(S): 8 TABLET, FILM COATED, EXTENDED RELEASE ORAL at 13:14

## 2017-07-05 RX ADMIN — CARBIDOPA AND LEVODOPA 1.5 TABLET(S): 25; 100 TABLET ORAL at 13:14

## 2017-07-05 RX ADMIN — Medication 81 MILLIGRAM(S): at 09:14

## 2017-07-05 RX ADMIN — LEVETIRACETAM 250 MILLIGRAM(S): 250 TABLET, FILM COATED ORAL at 05:36

## 2017-07-05 NOTE — PROGRESS NOTE ADULT - PROBLEM SELECTOR PROBLEM 1
Sick sinus syndrome
Atrioventricular block
Sick sinus syndrome
Atrioventricular block

## 2017-07-05 NOTE — PROGRESS NOTE ADULT - SUBJECTIVE AND OBJECTIVE BOX
INTERVAL HPI/OVERNIGHT EVENTS: Resting comfortably in bed    MEDICATIONS  (STANDING):  aspirin enteric coated 81 milliGRAM(s) Oral daily  levETIRAcetam 250 milliGRAM(s) Oral two times a day  rOPINIRole 2 milliGRAM(s) Oral three times a day  QUEtiapine 25 milliGRAM(s) Oral at bedtime  carbidopa/levodopa  25/100 1.5 Tablet(s) Oral <User Schedule>  carbidopa/levodopa  25/100 1.5 Tablet(s) Oral <User Schedule>  carbidopa/levodopa  25/100 2 Tablet(s) Oral <User Schedule>  carbidopa/levodopa CR 50/200 1 Tablet(s) Oral at bedtime    MEDICATIONS  (PRN):  acetaminophen   Tablet. 650 milliGRAM(s) Oral every 6 hours PRN Mild Pain (1 - 3)      Allergies    No Known Allergies    Intolerances    Namenda (Other (Severe))    ROS:  General: Pt denies fever/chills    Cardiovascular: denies chest pain/palpitations/leg edema    Respiratory and Thorax: denies SOB/cough/wheezing    Gastrointestinal: denies abdominal pain/diarrhea/constipation/bloody stool    Musculoskeletal: denies joint pain or swelling, denies restricted motion    Skin: denies rashes/sores    Hematologic: denies abnormal bleeding    Vital Signs Last 24 Hrs  T(C): 36.4 (05 Jul 2017 04:43), Max: 36.8 (04 Jul 2017 21:08)  T(F): 97.5 (05 Jul 2017 04:43), Max: 98.2 (04 Jul 2017 21:08)  HR: 76 (05 Jul 2017 04:43) (76 - 80)  BP: 107/64 (05 Jul 2017 04:43) (107/64 - 126/74)  BP(mean): --  RR: 18 (05 Jul 2017 04:43) (18 - 18)  SpO2: 95% (05 Jul 2017 04:43) (93% - 95%)    Constitutional: well developed, well nourished, no deformities and no acute distress    Neurological: Alert & Oriented x 3, LUNA, no focal deficits    HEENT: NC/AT, PERRLA, EOMI,  Neck supple.    Respiratory: CTA B/L, No wheezing/crackles/rhonchi    Cardiovascular: (+) S1 & S2, RRR, No m/r/g    Gastrointestinal: soft, NT, nondistended, (+) BS    Genitourinary: non distended bladder, voiding freely    Extremities: No pedal edema, No clubbing, No cyanosis    Skin:  normal skin color and pigmentation, no skin lesions            LABS:                RADIOLOGY & ADDITIONAL TESTS:    TELE:    EKG: INTERVAL HPI/OVERNIGHT EVENTS: Resting comfortably in bed    MEDICATIONS  (STANDING):  aspirin enteric coated 81 milliGRAM(s) Oral daily  levETIRAcetam 250 milliGRAM(s) Oral two times a day  rOPINIRole 2 milliGRAM(s) Oral three times a day  QUEtiapine 25 milliGRAM(s) Oral at bedtime  carbidopa/levodopa  25/100 1.5 Tablet(s) Oral <User Schedule>  carbidopa/levodopa  25/100 1.5 Tablet(s) Oral <User Schedule>  carbidopa/levodopa  25/100 2 Tablet(s) Oral <User Schedule>  carbidopa/levodopa CR 50/200 1 Tablet(s) Oral at bedtime    MEDICATIONS  (PRN):  acetaminophen   Tablet. 650 milliGRAM(s) Oral every 6 hours PRN Mild Pain (1 - 3)      Allergies    No Known Allergies    Intolerances    Namenda (Other (Severe))    ROS:  General: Pt denies fever/chills    Cardiovascular: denies chest pain/palpitations/leg edema    Respiratory and Thorax: denies SOB/cough/wheezing    Gastrointestinal: denies abdominal pain/diarrhea/constipation/bloody stool    Musculoskeletal: denies joint pain or swelling, denies restricted motion    Skin: denies rashes/sores    Hematologic: denies abnormal bleeding    Vital Signs Last 24 Hrs  T(C): 36.4 (05 Jul 2017 04:43), Max: 36.8 (04 Jul 2017 21:08)  T(F): 97.5 (05 Jul 2017 04:43), Max: 98.2 (04 Jul 2017 21:08)  HR: 76 (05 Jul 2017 04:43) (76 - 80)  BP: 107/64 (05 Jul 2017 04:43) (107/64 - 126/74)  BP(mean): --  RR: 18 (05 Jul 2017 04:43) (18 - 18)  SpO2: 95% (05 Jul 2017 04:43) (93% - 95%)    Constitutional: well developed, well nourished, no deformities and no acute distress    Neurological: Alert & Oriented x 3, LUNA, no focal deficits    HEENT: NC/AT, PERRLA, EOMI,  Neck supple.    Respiratory: CTA B/L, No wheezing/crackles/rhonchi    Cardiovascular: (+) S1 & S2, RRR, No m/r/g    Gastrointestinal: soft, NT, nondistended, (+) BS    Genitourinary: non distended bladder, voiding freely    Extremities: No pedal edema, No clubbing, No cyanosis    Skin:  Left infraclavicular site no bleeding or hematoma noted.            TELE: SR 60- 70's

## 2017-07-05 NOTE — PROGRESS NOTE ADULT - PROBLEM SELECTOR PLAN 1
Post device teaching reinforced with patient and family   Awaiting Rehab placement.   F/U with EP in 7-10 days for wound check appt.   Tylenol PRN mild pain.

## 2017-07-12 ENCOUNTER — NON-APPOINTMENT (OUTPATIENT)
Age: 82
End: 2017-07-12

## 2017-07-12 ENCOUNTER — APPOINTMENT (OUTPATIENT)
Dept: ELECTROPHYSIOLOGY | Facility: CLINIC | Age: 82
End: 2017-07-12

## 2017-07-12 VITALS
SYSTOLIC BLOOD PRESSURE: 160 MMHG | HEIGHT: 61 IN | BODY MASS INDEX: 20.77 KG/M2 | WEIGHT: 110 LBS | DIASTOLIC BLOOD PRESSURE: 70 MMHG

## 2017-07-12 DIAGNOSIS — Z86.69 PERSONAL HISTORY OF OTHER DISEASES OF THE NERVOUS SYSTEM AND SENSE ORGANS: ICD-10-CM

## 2017-07-12 RX ORDER — CARBIDOPA AND LEVODOPA 50; 200 MG/1; MG/1
50-200 TABLET, EXTENDED RELEASE ORAL
Refills: 0 | Status: ACTIVE | COMMUNITY

## 2017-07-12 RX ORDER — ROPINIROLE 2 MG/1
2 TABLET, FILM COATED ORAL
Refills: 0 | Status: ACTIVE | COMMUNITY

## 2017-07-12 RX ORDER — CARBIDOPA AND LEVODOPA 25; 100 MG/1; MG/1
25-100 TABLET ORAL
Refills: 0 | Status: ACTIVE | COMMUNITY

## 2017-10-13 ENCOUNTER — NON-APPOINTMENT (OUTPATIENT)
Age: 82
End: 2017-10-13

## 2017-10-13 ENCOUNTER — APPOINTMENT (OUTPATIENT)
Dept: ELECTROPHYSIOLOGY | Facility: CLINIC | Age: 82
End: 2017-10-13
Payer: MEDICARE

## 2017-10-13 VITALS — DIASTOLIC BLOOD PRESSURE: 60 MMHG | SYSTOLIC BLOOD PRESSURE: 90 MMHG

## 2017-10-13 VITALS — HEART RATE: 74 BPM | OXYGEN SATURATION: 95 %

## 2017-10-13 PROCEDURE — 99214 OFFICE O/P EST MOD 30 MIN: CPT

## 2017-10-13 PROCEDURE — 93000 ELECTROCARDIOGRAM COMPLETE: CPT

## 2017-10-13 RX ORDER — QUETIAPINE 150 MG/1
TABLET, EXTENDED RELEASE ORAL
Refills: 0 | Status: DISCONTINUED | COMMUNITY
End: 2017-10-13

## 2017-10-13 RX ORDER — LEVETIRACETAM 250 MG/1
250 TABLET, FILM COATED ORAL
Refills: 0 | Status: DISCONTINUED | COMMUNITY
End: 2017-10-13

## 2017-10-13 RX ORDER — RIVASTIGMINE TARTRATE 6 MG/1
6 CAPSULE ORAL TWICE DAILY
Refills: 0 | Status: ACTIVE | COMMUNITY

## 2017-12-05 ENCOUNTER — APPOINTMENT (OUTPATIENT)
Dept: ELECTROPHYSIOLOGY | Facility: CLINIC | Age: 82
End: 2017-12-05
Payer: MEDICARE

## 2017-12-05 VITALS
WEIGHT: 105 LBS | HEART RATE: 75 BPM | DIASTOLIC BLOOD PRESSURE: 46 MMHG | SYSTOLIC BLOOD PRESSURE: 112 MMHG | OXYGEN SATURATION: 97 % | BODY MASS INDEX: 19.84 KG/M2

## 2017-12-05 PROCEDURE — 93280 PM DEVICE PROGR EVAL DUAL: CPT

## 2018-03-08 ENCOUNTER — APPOINTMENT (OUTPATIENT)
Dept: ELECTROPHYSIOLOGY | Facility: CLINIC | Age: 83
End: 2018-03-08

## 2018-04-16 ENCOUNTER — APPOINTMENT (OUTPATIENT)
Dept: ELECTROPHYSIOLOGY | Facility: CLINIC | Age: 83
End: 2018-04-16

## 2018-07-13 ENCOUNTER — APPOINTMENT (OUTPATIENT)
Dept: ELECTROPHYSIOLOGY | Facility: CLINIC | Age: 83
End: 2018-07-13
Payer: MEDICARE

## 2018-07-13 VITALS
HEART RATE: 62 BPM | SYSTOLIC BLOOD PRESSURE: 128 MMHG | HEIGHT: 61 IN | DIASTOLIC BLOOD PRESSURE: 57 MMHG | OXYGEN SATURATION: 98 % | BODY MASS INDEX: 19.63 KG/M2 | WEIGHT: 104 LBS

## 2018-07-13 PROCEDURE — 93280 PM DEVICE PROGR EVAL DUAL: CPT

## 2018-09-27 ENCOUNTER — APPOINTMENT (OUTPATIENT)
Dept: ELECTROPHYSIOLOGY | Facility: CLINIC | Age: 83
End: 2018-09-27
Payer: SELF-PAY

## 2018-09-27 ENCOUNTER — APPOINTMENT (OUTPATIENT)
Dept: ELECTROPHYSIOLOGY | Facility: CLINIC | Age: 83
End: 2018-09-27
Payer: MEDICARE

## 2018-09-27 DIAGNOSIS — I44.2 ATRIOVENTRICULAR BLOCK, COMPLETE: ICD-10-CM

## 2018-09-27 DIAGNOSIS — Z95.0 PRESENCE OF CARDIAC PACEMAKER: ICD-10-CM

## 2018-09-27 PROCEDURE — 93294 REM INTERROG EVL PM/LDLS PM: CPT

## 2018-09-27 PROCEDURE — 93296 REM INTERROG EVL PM/IDS: CPT

## 2019-01-18 ENCOUNTER — APPOINTMENT (OUTPATIENT)
Dept: ELECTROPHYSIOLOGY | Facility: CLINIC | Age: 84
End: 2019-01-18

## 2019-01-18 ENCOUNTER — APPOINTMENT (OUTPATIENT)
Dept: ELECTROPHYSIOLOGY | Facility: CLINIC | Age: 84
End: 2019-01-18
Payer: MEDICARE

## 2019-01-18 VITALS
OXYGEN SATURATION: 96 % | HEIGHT: 60 IN | BODY MASS INDEX: 19.63 KG/M2 | DIASTOLIC BLOOD PRESSURE: 54 MMHG | WEIGHT: 100 LBS | HEART RATE: 73 BPM | SYSTOLIC BLOOD PRESSURE: 133 MMHG

## 2019-01-18 PROCEDURE — 93280 PM DEVICE PROGR EVAL DUAL: CPT

## 2019-04-01 PROBLEM — Z95.0 CARDIAC PACEMAKER: Status: ACTIVE | Noted: 2017-07-12

## 2019-04-01 PROBLEM — I44.2 THIRD DEGREE HEART BLOCK: Status: ACTIVE | Noted: 2017-07-12

## 2019-05-21 NOTE — PHYSICAL THERAPY INITIAL EVALUATION ADULT - LEVEL OF CONSCIOUSNESS, REHAB EVAL
"    5/21/2019         RE: Johnna Caballero  [id number]  [facility] [id number]        Dear Colleague,    Thank you for referring your patient, Johnna Caballero, to the [facility] [place]. Please see a copy of my visit note below.    Podiatry / Foot and Ankle Surgery Progress Note    May 21, 2019    Subject: Patient was seen for follow up on serial injection to right foot for neuroma treatment.  Notes she felt it is starting to help more. Not as sore. 2nd toe is more sore. Boot does not seem to be helping.       Objective:  Vitals: Ht 1.702 m (5' 7\")   Wt 129.3 kg (285 lb)   BMI 44.64 kg/m     BMI= Body mass index is 44.64 kg/m .    A1C: 8.7 (4/2019)     General appearance: Patient is alert and fully cooperative with history & exam.  No sign of distress is noted during the visit.     Psychiatric: Affect is pleasant & appropriate.  Patient appears motivated to improve health.     Respiratory: Breathing is regular & unlabored while sitting.     HEENT: Hearing is intact to spoken word.  Speech is clear.  No gross evidence of visual impairment that would impact ambulation.     Dermatologic: Skin is intact to both lower extremities without significant lesions, rash or abrasion.  No paronychia or evidence of soft tissue infection is noted.     Vascular: DP & PT pulses are intact & regular bilaterally.   edema and varicosities noted.  CFT and skin temperature is normal to both lower extremities.     Neurologic: Lower extremity sensation is diminished to feet.      Musculoskeletal: Patient is ambulatory without assistive device or brace. Decrease arch height. Pain on palpation of the right 3rd intermetatarsal space and to plantar distal right 2nd metatarsal head. No range of motion at right interphalangeal joint. Decrease dorsiflexion right ankle.     Radiographs:  I personally reviewed the xrays. Degenerative changes to midfoot. No fractures noted. Fusion of the right interphalangeal joint. Prominent navicular " "tuberosity.      ASSESSMENT:     Right foot pain  Type 2 diabetes mellitus without complication, with long-term current use of insulin (H)  Hola's neuroma of right foot  Pes planus of both feet  Hallux hammertoe, right  Gastrocnemius equinus, right  Chronic pain syndrome  Morbid obesity with BMI of 40.0-44.9, adult (H)     PLAN:  Reviewed patient's chart in epic.  Had a long discussion with patient. Talked about diabetes and foot care. This could be a cause of numbness as well as the multiple surgeries to the feet. Discussed that we can on reverse the numbness sensations.       We discussed causes and treatments of neuromas.  These included shoe gear, orthotics, metatarsal pads, cortisone injections, ice, physical therapy, and possible surgical removal.   Given that she has had 2 surgeries to remove neuromas, could be phantom pain. Talked about sclerosing alcohol injections. Discussed this is a series of injections every 2 weeks for up to 7 times to try to \"deaden\" the nerve to help with pain. She can't do iontophoresis as it reacts to her skin. Talked about new inserts.      She would like to start serial injections today. Follow up in 2 weeks for 4th injection.      Reviewed and discussed causes of capsulitis.  Talked about how the elongated 2nd metatarsal can cause more pressure to occur to the joint.  We talked about treatments such as padding, orthotics, injection, physical therapy, immobilization, MRI, and possible surgery to shorten metatarsal.      Will transition to shoes and custom inserts. Was given an order for this. If pain continues, recommend MRI to assess for tear in 2nd metatarsal phalangeal joint capsule.     Procedure:  After verbal consent, the foot was prepped and draped using sterile technique.  A mixture of 2cc of a mix of 1% lidocaine plain dehydrogentated alcohol was injected into the right 3rd intermetatarsal nerve. This is the  3rd injection in the series. Patient tolerated procedure and " anesthesia well.    Suzanna Strong DPM, Podiatry/Foot and Ankle Surgery    Weight management plan: Patient was referred to their PCP to discuss a diet and exercise plan.    Recommended to Johnna Caballero to follow up with Primary Care provider regarding elevated blood pressure.      Again, thank you for allowing me to participate in the care of your patient.        Sincerely,        Suzanna Strong DPM, Podiatry/Foot and Ankle Surgery     alert

## 2019-07-19 ENCOUNTER — APPOINTMENT (OUTPATIENT)
Dept: ELECTROPHYSIOLOGY | Facility: CLINIC | Age: 84
End: 2019-07-19
Payer: MEDICARE

## 2019-07-19 VITALS — HEART RATE: 75 BPM | HEIGHT: 60 IN | BODY MASS INDEX: 19.63 KG/M2 | WEIGHT: 100 LBS | OXYGEN SATURATION: 99 %

## 2019-07-19 PROCEDURE — 93280 PM DEVICE PROGR EVAL DUAL: CPT

## 2019-07-19 RX ORDER — ASPIRIN ENTERIC COATED TABLETS 81 MG 81 MG/1
81 TABLET, DELAYED RELEASE ORAL
Refills: 0 | Status: DISCONTINUED | COMMUNITY
End: 2019-07-19

## 2019-07-19 RX ORDER — CHOLECALCIFEROL (VITAMIN D3) 25 MCG
TABLET ORAL
Refills: 0 | Status: ACTIVE | COMMUNITY

## 2019-07-19 RX ORDER — PIMAVANSERIN TARTRATE 34 MG/1
34 CAPSULE ORAL
Refills: 0 | Status: ACTIVE | COMMUNITY

## 2019-07-19 RX ORDER — MEMANTINE HYDROCHLORIDE 5 MG/1
5 TABLET, FILM COATED ORAL
Refills: 0 | Status: ACTIVE | COMMUNITY

## 2019-07-19 RX ORDER — MELATONIN 3 MG
3 CAPSULE ORAL
Refills: 0 | Status: ACTIVE | COMMUNITY

## 2019-10-22 ENCOUNTER — APPOINTMENT (OUTPATIENT)
Dept: ELECTROPHYSIOLOGY | Facility: CLINIC | Age: 84
End: 2019-10-22

## 2020-01-31 ENCOUNTER — INPATIENT (INPATIENT)
Facility: HOSPITAL | Age: 85
LOS: 4 days | Discharge: HOSPICE | DRG: 640 | End: 2020-02-05
Attending: INTERNAL MEDICINE | Admitting: INTERNAL MEDICINE
Payer: MEDICARE

## 2020-01-31 VITALS
WEIGHT: 110.01 LBS | TEMPERATURE: 101 F | DIASTOLIC BLOOD PRESSURE: 80 MMHG | OXYGEN SATURATION: 94 % | HEART RATE: 80 BPM | HEIGHT: 62 IN | RESPIRATION RATE: 18 BRPM | SYSTOLIC BLOOD PRESSURE: 130 MMHG

## 2020-01-31 DIAGNOSIS — Z90.49 ACQUIRED ABSENCE OF OTHER SPECIFIED PARTS OF DIGESTIVE TRACT: Chronic | ICD-10-CM

## 2020-01-31 LAB
ANION GAP SERPL CALC-SCNC: 7 MMOL/L — SIGNIFICANT CHANGE UP (ref 5–17)
APPEARANCE UR: CLEAR — SIGNIFICANT CHANGE UP
APTT BLD: 27.8 SEC — LOW (ref 28.5–37)
BASOPHILS # BLD AUTO: 0.03 K/UL — SIGNIFICANT CHANGE UP (ref 0–0.2)
BASOPHILS NFR BLD AUTO: 0.2 % — SIGNIFICANT CHANGE UP (ref 0–2)
BILIRUB UR-MCNC: NEGATIVE — SIGNIFICANT CHANGE UP
BUN SERPL-MCNC: 17 MG/DL — SIGNIFICANT CHANGE UP (ref 7–23)
CALCIUM SERPL-MCNC: 8.3 MG/DL — LOW (ref 8.4–10.5)
CHLORIDE SERPL-SCNC: 100 MMOL/L — SIGNIFICANT CHANGE UP (ref 96–108)
CO2 SERPL-SCNC: 31 MMOL/L — SIGNIFICANT CHANGE UP (ref 22–31)
COLOR SPEC: YELLOW — SIGNIFICANT CHANGE UP
CREAT SERPL-MCNC: 0.69 MG/DL — SIGNIFICANT CHANGE UP (ref 0.5–1.3)
DIFF PNL FLD: NEGATIVE — SIGNIFICANT CHANGE UP
EOSINOPHIL # BLD AUTO: 0.09 K/UL — SIGNIFICANT CHANGE UP (ref 0–0.5)
EOSINOPHIL NFR BLD AUTO: 0.6 % — SIGNIFICANT CHANGE UP (ref 0–6)
GLUCOSE SERPL-MCNC: 124 MG/DL — HIGH (ref 70–99)
GLUCOSE UR QL: NEGATIVE MG/DL — SIGNIFICANT CHANGE UP
HCT VFR BLD CALC: 33.1 % — LOW (ref 34.5–45)
HGB BLD-MCNC: 10.7 G/DL — LOW (ref 11.5–15.5)
IMM GRANULOCYTES NFR BLD AUTO: 0.5 % — SIGNIFICANT CHANGE UP (ref 0–1.5)
INR BLD: 1.24 RATIO — HIGH (ref 0.88–1.16)
KETONES UR-MCNC: NEGATIVE — SIGNIFICANT CHANGE UP
LEUKOCYTE ESTERASE UR-ACNC: ABNORMAL
LYMPHOCYTES # BLD AUTO: 21.3 % — SIGNIFICANT CHANGE UP (ref 13–44)
LYMPHOCYTES # BLD AUTO: 3.18 K/UL — SIGNIFICANT CHANGE UP (ref 1–3.3)
MCHC RBC-ENTMCNC: 29.3 PG — SIGNIFICANT CHANGE UP (ref 27–34)
MCHC RBC-ENTMCNC: 32.3 GM/DL — SIGNIFICANT CHANGE UP (ref 32–36)
MCV RBC AUTO: 90.7 FL — SIGNIFICANT CHANGE UP (ref 80–100)
MONOCYTES # BLD AUTO: 1.08 K/UL — HIGH (ref 0–0.9)
MONOCYTES NFR BLD AUTO: 7.2 % — SIGNIFICANT CHANGE UP (ref 2–14)
NEUTROPHILS # BLD AUTO: 10.49 K/UL — HIGH (ref 1.8–7.4)
NEUTROPHILS NFR BLD AUTO: 70.2 % — SIGNIFICANT CHANGE UP (ref 43–77)
NITRITE UR-MCNC: NEGATIVE — SIGNIFICANT CHANGE UP
NRBC # BLD: 0 /100 WBCS — SIGNIFICANT CHANGE UP (ref 0–0)
PH UR: 7 — SIGNIFICANT CHANGE UP (ref 5–8)
PLATELET # BLD AUTO: 296 K/UL — SIGNIFICANT CHANGE UP (ref 150–400)
POTASSIUM SERPL-MCNC: 4 MMOL/L — SIGNIFICANT CHANGE UP (ref 3.5–5.3)
POTASSIUM SERPL-SCNC: 4 MMOL/L — SIGNIFICANT CHANGE UP (ref 3.5–5.3)
PROT UR-MCNC: 30 MG/DL
PROTHROM AB SERPL-ACNC: 13.7 SEC — HIGH (ref 10–12.9)
RBC # BLD: 3.65 M/UL — LOW (ref 3.8–5.2)
RBC # FLD: 13.2 % — SIGNIFICANT CHANGE UP (ref 10.3–14.5)
SODIUM SERPL-SCNC: 138 MMOL/L — SIGNIFICANT CHANGE UP (ref 135–145)
SP GR SPEC: 1.01 — SIGNIFICANT CHANGE UP (ref 1.01–1.02)
UROBILINOGEN FLD QL: 1 MG/DL
WBC # BLD: 14.94 K/UL — HIGH (ref 3.8–10.5)
WBC # FLD AUTO: 14.94 K/UL — HIGH (ref 3.8–10.5)

## 2020-01-31 PROCEDURE — 93010 ELECTROCARDIOGRAM REPORT: CPT

## 2020-01-31 RX ORDER — ACETAMINOPHEN 500 MG
650 TABLET ORAL ONCE
Refills: 0 | Status: COMPLETED | OUTPATIENT
Start: 2020-01-31 | End: 2020-01-31

## 2020-01-31 RX ORDER — SODIUM CHLORIDE 9 MG/ML
1550 INJECTION INTRAMUSCULAR; INTRAVENOUS; SUBCUTANEOUS ONCE
Refills: 0 | Status: COMPLETED | OUTPATIENT
Start: 2020-01-31 | End: 2020-01-31

## 2020-01-31 RX ADMIN — SODIUM CHLORIDE 1550 MILLILITER(S): 9 INJECTION INTRAMUSCULAR; INTRAVENOUS; SUBCUTANEOUS at 22:50

## 2020-01-31 RX ADMIN — Medication 650 MILLIGRAM(S): at 23:43

## 2020-01-31 NOTE — ED ADULT NURSE NOTE - NSIMPLEMENTINTERV_GEN_ALL_ED
Implemented All Fall with Harm Risk Interventions:  [place] to call system. Call bell, personal items and telephone within reach. Instruct patient to call for assistance. Room bathroom lighting operational. Non-slip footwear when patient is off stretcher. Physically safe environment: no spills, clutter or unnecessary equipment. Stretcher in lowest position, wheels locked, appropriate side rails in place. Provide visual cue, wrist band, yellow gown, etc. Monitor gait and stability. Monitor for mental status changes and reorient to person, place, and time. Review medications for side effects contributing to fall risk. Reinforce activity limits and safety measures with patient and family. Provide visual clues: red socks.

## 2020-01-31 NOTE — ED ADULT NURSE NOTE - FUNCTIONAL LEVEL EATING, MLM
completely dependent Consent (Scalp)/Introductory Paragraph: The rationale for Mohs was explained to the patient and consent was obtained. The risks, benefits and alternatives to therapy were discussed in detail. Specifically, the risks of changes in hair growth pattern secondary to repair, infection, scarring, bleeding, prolonged wound healing, incomplete removal, allergy to anesthesia, nerve injury and recurrence were addressed. Prior to the procedure, the treatment site was clearly identified and confirmed by the patient. All components of Universal Protocol/PAUSE Rule completed.

## 2020-01-31 NOTE — ED ADULT NURSE NOTE - OBJECTIVE STATEMENT
Patient sent from [facility] for evaluation of elevated white blood cell count. Patient presents awake, nonverbal, skin hot and dry. Large deep sacral decub noted with eschar. Large ball of feces at rectum.

## 2020-01-31 NOTE — ED ADULT NURSE NOTE - PMH
COPD (chronic obstructive pulmonary disease)    Decubital ulcer  sacrum  Dementia    Dysphagia    Former smoker    History of cholecystectomy    Parkinsons    Sick sinus syndrome    Syncope

## 2020-01-31 NOTE — ED ADULT NURSE NOTE - CHPI ED NUR SYMPTOMS NEG
no chills/no decreased eating/drinking/no dizziness/no nausea/no pain/no tingling/no vomiting/no weakness

## 2020-01-31 NOTE — ED ADULT NURSE NOTE - ED STAT RN HANDOFF DETAILS
Patient endorsed to Rusty eFliciano RN. IV patent and patient receiving bolus as ordered. Family at bedside.

## 2020-02-01 DIAGNOSIS — G20 PARKINSON'S DISEASE: ICD-10-CM

## 2020-02-01 DIAGNOSIS — R50.9 FEVER, UNSPECIFIED: ICD-10-CM

## 2020-02-01 DIAGNOSIS — F03.90 UNSPECIFIED DEMENTIA WITHOUT BEHAVIORAL DISTURBANCE: ICD-10-CM

## 2020-02-01 DIAGNOSIS — L89.90 PRESSURE ULCER OF UNSPECIFIED SITE, UNSPECIFIED STAGE: ICD-10-CM

## 2020-02-01 DIAGNOSIS — J18.9 PNEUMONIA, UNSPECIFIED ORGANISM: ICD-10-CM

## 2020-02-01 DIAGNOSIS — R13.10 DYSPHAGIA, UNSPECIFIED: ICD-10-CM

## 2020-02-01 LAB
ALBUMIN SERPL ELPH-MCNC: 1.8 G/DL — LOW (ref 3.3–5)
ALP SERPL-CCNC: 88 U/L — SIGNIFICANT CHANGE UP (ref 30–120)
ALT FLD-CCNC: 22 U/L DA — SIGNIFICANT CHANGE UP (ref 10–60)
AST SERPL-CCNC: 37 U/L — SIGNIFICANT CHANGE UP (ref 10–40)
BASE EXCESS BLDV CALC-SCNC: 2.4 MMOL/L — HIGH (ref -2–2)
BILIRUB SERPL-MCNC: 0.3 MG/DL — SIGNIFICANT CHANGE UP (ref 0.2–1.2)
BLOOD GAS COMMENTS, VENOUS: SIGNIFICANT CHANGE UP
EPI CELLS # UR: SIGNIFICANT CHANGE UP
FLU A RESULT: SIGNIFICANT CHANGE UP
FLU A RESULT: SIGNIFICANT CHANGE UP
FLUAV AG NPH QL: SIGNIFICANT CHANGE UP
FLUBV AG NPH QL: SIGNIFICANT CHANGE UP
GAS PNL BLDV: SIGNIFICANT CHANGE UP
HCO3 BLDV-SCNC: 27 MMOL/L — SIGNIFICANT CHANGE UP (ref 21–29)
HOROWITZ INDEX BLDV+IHG-RTO: 21 — SIGNIFICANT CHANGE UP
LACTATE SERPL-SCNC: 1.5 MMOL/L — SIGNIFICANT CHANGE UP (ref 0.7–2)
MAGNESIUM SERPL-MCNC: 1.8 MG/DL — SIGNIFICANT CHANGE UP (ref 1.6–2.6)
MRSA PCR RESULT.: SIGNIFICANT CHANGE UP
PCO2 BLDV: 38 MMHG — SIGNIFICANT CHANGE UP (ref 35–50)
PH BLDV: 7.5 — HIGH (ref 7.35–7.45)
PHOSPHATE SERPL-MCNC: 3.5 MG/DL — SIGNIFICANT CHANGE UP (ref 2.5–4.5)
PO2 BLDV: 56 MMHG — HIGH (ref 25–45)
PROT SERPL-MCNC: 6 G/DL — SIGNIFICANT CHANGE UP (ref 6–8.3)
RBC CASTS # UR COMP ASSIST: ABNORMAL /HPF (ref 0–4)
RSV RESULT: SIGNIFICANT CHANGE UP
RSV RNA RESP QL NAA+PROBE: SIGNIFICANT CHANGE UP
S AUREUS DNA NOSE QL NAA+PROBE: SIGNIFICANT CHANGE UP
SAO2 % BLDV: 88 % — SIGNIFICANT CHANGE UP (ref 67–88)
WBC UR QL: SIGNIFICANT CHANGE UP

## 2020-02-01 PROCEDURE — 71250 CT THORAX DX C-: CPT | Mod: 26

## 2020-02-01 PROCEDURE — 71045 X-RAY EXAM CHEST 1 VIEW: CPT | Mod: 26

## 2020-02-01 PROCEDURE — 99285 EMERGENCY DEPT VISIT HI MDM: CPT

## 2020-02-01 RX ORDER — CARBIDOPA AND LEVODOPA 25; 100 MG/1; MG/1
1.5 TABLET ORAL
Refills: 0 | Status: DISCONTINUED | OUTPATIENT
Start: 2020-02-01 | End: 2020-02-04

## 2020-02-01 RX ORDER — VANCOMYCIN HCL 1 G
1000 VIAL (EA) INTRAVENOUS ONCE
Refills: 0 | Status: COMPLETED | OUTPATIENT
Start: 2020-02-01 | End: 2020-02-01

## 2020-02-01 RX ORDER — LANOLIN ALCOHOL/MO/W.PET/CERES
3 CREAM (GRAM) TOPICAL AT BEDTIME
Refills: 0 | Status: DISCONTINUED | OUTPATIENT
Start: 2020-02-01 | End: 2020-02-04

## 2020-02-01 RX ORDER — QUETIAPINE FUMARATE 200 MG/1
25 TABLET, FILM COATED ORAL AT BEDTIME
Refills: 0 | Status: DISCONTINUED | OUTPATIENT
Start: 2020-02-01 | End: 2020-02-04

## 2020-02-01 RX ORDER — ENOXAPARIN SODIUM 100 MG/ML
40 INJECTION SUBCUTANEOUS DAILY
Refills: 0 | Status: DISCONTINUED | OUTPATIENT
Start: 2020-02-01 | End: 2020-02-04

## 2020-02-01 RX ORDER — PIMAVANSERIN TARTRATE 10 MG/1
34 TABLET, COATED ORAL AT BEDTIME
Refills: 0 | Status: DISCONTINUED | OUTPATIENT
Start: 2020-02-01 | End: 2020-02-04

## 2020-02-01 RX ORDER — ACETAMINOPHEN 500 MG
650 TABLET ORAL EVERY 6 HOURS
Refills: 0 | Status: DISCONTINUED | OUTPATIENT
Start: 2020-02-01 | End: 2020-02-04

## 2020-02-01 RX ORDER — CARBIDOPA AND LEVODOPA 25; 100 MG/1; MG/1
1 TABLET ORAL
Refills: 0 | Status: DISCONTINUED | OUTPATIENT
Start: 2020-02-01 | End: 2020-02-04

## 2020-02-01 RX ORDER — RIVASTIGMINE 4.6 MG/24H
6 PATCH, EXTENDED RELEASE TRANSDERMAL
Refills: 0 | Status: DISCONTINUED | OUTPATIENT
Start: 2020-02-01 | End: 2020-02-04

## 2020-02-01 RX ORDER — PIPERACILLIN AND TAZOBACTAM 4; .5 G/20ML; G/20ML
3.38 INJECTION, POWDER, LYOPHILIZED, FOR SOLUTION INTRAVENOUS ONCE
Refills: 0 | Status: COMPLETED | OUTPATIENT
Start: 2020-02-01 | End: 2020-02-01

## 2020-02-01 RX ORDER — COLLAGENASE CLOSTRIDIUM HIST. 250 UNIT/G
1 OINTMENT (GRAM) TOPICAL
Refills: 0 | Status: DISCONTINUED | OUTPATIENT
Start: 2020-02-01 | End: 2020-02-03

## 2020-02-01 RX ORDER — LACTOBACILLUS ACIDOPHILUS 100MM CELL
1 CAPSULE ORAL
Refills: 0 | Status: DISCONTINUED | OUTPATIENT
Start: 2020-02-01 | End: 2020-02-04

## 2020-02-01 RX ORDER — VANCOMYCIN HCL 1 G
1000 VIAL (EA) INTRAVENOUS EVERY 12 HOURS
Refills: 0 | Status: DISCONTINUED | OUTPATIENT
Start: 2020-02-01 | End: 2020-02-01

## 2020-02-01 RX ORDER — SODIUM CHLORIDE 9 MG/ML
1000 INJECTION INTRAMUSCULAR; INTRAVENOUS; SUBCUTANEOUS
Refills: 0 | Status: DISCONTINUED | OUTPATIENT
Start: 2020-02-01 | End: 2020-02-04

## 2020-02-01 RX ORDER — ROPINIROLE 8 MG/1
2 TABLET, FILM COATED, EXTENDED RELEASE ORAL THREE TIMES A DAY
Refills: 0 | Status: DISCONTINUED | OUTPATIENT
Start: 2020-02-01 | End: 2020-02-04

## 2020-02-01 RX ORDER — IPRATROPIUM/ALBUTEROL SULFATE 18-103MCG
3 AEROSOL WITH ADAPTER (GRAM) INHALATION EVERY 8 HOURS
Refills: 0 | Status: DISCONTINUED | OUTPATIENT
Start: 2020-02-01 | End: 2020-02-04

## 2020-02-01 RX ORDER — PIPERACILLIN AND TAZOBACTAM 4; .5 G/20ML; G/20ML
3.38 INJECTION, POWDER, LYOPHILIZED, FOR SOLUTION INTRAVENOUS EVERY 8 HOURS
Refills: 0 | Status: DISCONTINUED | OUTPATIENT
Start: 2020-02-01 | End: 2020-02-04

## 2020-02-01 RX ORDER — VANCOMYCIN HCL 1 G
1000 VIAL (EA) INTRAVENOUS EVERY 24 HOURS
Refills: 0 | Status: DISCONTINUED | OUTPATIENT
Start: 2020-02-02 | End: 2020-02-02

## 2020-02-01 RX ADMIN — Medication 1 APPLICATION(S): at 18:38

## 2020-02-01 RX ADMIN — Medication 3 MILLIGRAM(S): at 21:49

## 2020-02-01 RX ADMIN — CARBIDOPA AND LEVODOPA 1.5 TABLET(S): 25; 100 TABLET ORAL at 12:08

## 2020-02-01 RX ADMIN — Medication 3 MILLILITER(S): at 07:50

## 2020-02-01 RX ADMIN — CARBIDOPA AND LEVODOPA 1 TABLET(S): 25; 100 TABLET ORAL at 21:48

## 2020-02-01 RX ADMIN — PIPERACILLIN AND TAZOBACTAM 25 GRAM(S): 4; .5 INJECTION, POWDER, LYOPHILIZED, FOR SOLUTION INTRAVENOUS at 09:34

## 2020-02-01 RX ADMIN — SODIUM CHLORIDE 50 MILLILITER(S): 9 INJECTION INTRAMUSCULAR; INTRAVENOUS; SUBCUTANEOUS at 01:54

## 2020-02-01 RX ADMIN — Medication 250 MILLIGRAM(S): at 12:12

## 2020-02-01 RX ADMIN — Medication 1 TABLET(S): at 09:34

## 2020-02-01 RX ADMIN — ENOXAPARIN SODIUM 40 MILLIGRAM(S): 100 INJECTION SUBCUTANEOUS at 12:07

## 2020-02-01 RX ADMIN — PIMAVANSERIN TARTRATE 34 MILLIGRAM(S): 10 TABLET, COATED ORAL at 21:49

## 2020-02-01 RX ADMIN — ROPINIROLE 2 MILLIGRAM(S): 8 TABLET, FILM COATED, EXTENDED RELEASE ORAL at 09:33

## 2020-02-01 RX ADMIN — Medication 250 MILLIGRAM(S): at 01:53

## 2020-02-01 RX ADMIN — RIVASTIGMINE 6 MILLIGRAM(S): 4.6 PATCH, EXTENDED RELEASE TRANSDERMAL at 18:48

## 2020-02-01 RX ADMIN — Medication 3 MILLILITER(S): at 23:23

## 2020-02-01 RX ADMIN — Medication 650 MILLIGRAM(S): at 02:40

## 2020-02-01 RX ADMIN — PIPERACILLIN AND TAZOBACTAM 25 GRAM(S): 4; .5 INJECTION, POWDER, LYOPHILIZED, FOR SOLUTION INTRAVENOUS at 18:39

## 2020-02-01 RX ADMIN — PIPERACILLIN AND TAZOBACTAM 200 GRAM(S): 4; .5 INJECTION, POWDER, LYOPHILIZED, FOR SOLUTION INTRAVENOUS at 01:53

## 2020-02-01 RX ADMIN — Medication 1 TABLET(S): at 18:37

## 2020-02-01 RX ADMIN — ROPINIROLE 2 MILLIGRAM(S): 8 TABLET, FILM COATED, EXTENDED RELEASE ORAL at 21:48

## 2020-02-01 RX ADMIN — ROPINIROLE 2 MILLIGRAM(S): 8 TABLET, FILM COATED, EXTENDED RELEASE ORAL at 13:08

## 2020-02-01 RX ADMIN — Medication 3 MILLILITER(S): at 15:36

## 2020-02-01 RX ADMIN — QUETIAPINE FUMARATE 25 MILLIGRAM(S): 200 TABLET, FILM COATED ORAL at 21:48

## 2020-02-01 RX ADMIN — CARBIDOPA AND LEVODOPA 1.5 TABLET(S): 25; 100 TABLET ORAL at 09:34

## 2020-02-01 NOTE — SWALLOW BEDSIDE ASSESSMENT ADULT - SWALLOW EVAL: DIAGNOSIS
Mild-moderate oral dysphagia marked by reduced bolus acceptance, reduced oral grading, intermittent oral defensiveness which improves with cues and use of spoon presentation, increased oral transit time with puree.  Oral stage further impacted by reduced cognition and behavioral overlay.  Pharyngeal stage WFL for consistencies administered, no overt s/s penetration/aspiration noted.

## 2020-02-01 NOTE — PHYSICAL THERAPY INITIAL EVALUATION ADULT - MANUAL MUSCLE TESTING RESULTS, REHAB EVAL
pt unable to follow commands; pt presented with minimal AROM in her BUE and BLE; digit extension appeared to be 0/5 bilaterally, elbow and shoulder ROM appeared to be 2-/5, as was hip and knee ROM. Ankle motion appeared to be 0/5./grossly assessed due to

## 2020-02-01 NOTE — SWALLOW BEDSIDE ASSESSMENT ADULT - ORAL PREPARATORY PHASE
Refuses to accept when presented via cup, improved with use of spoon presentation/Reduced oral grading Reduced acceptance into oral cavity requiring cues/Reduced oral grading

## 2020-02-01 NOTE — SWALLOW BEDSIDE ASSESSMENT ADULT - SWALLOW EVAL: RECOMMENDED FEEDING/EATING TECHNIQUES
ALL PO VIA SPOON PRESENTATION/allow for swallow between intakes/check mouth frequently for oral residue/pocketing/crush medication (when feasible)/maintain upright posture during/after eating for 30 mins/no straws/oral hygiene/position upright (90 degrees)/small sips/bites

## 2020-02-01 NOTE — PHYSICAL THERAPY INITIAL EVALUATION ADULT - GENERAL OBSERVATIONS, REHAB EVAL
Pt encountered sidelying in an ED bed with LARISSA Frazier at her side. Pt's resting posture was BUE and BLE flexion.

## 2020-02-01 NOTE — ED PROVIDER NOTE - CLINICAL SUMMARY MEDICAL DECISION MAKING FREE TEXT BOX
89 year old female with fever, decreased PO intake, dehydrate, FTT, recently admitted to [facility] for PNA.  Febrile and cachectic in ED.  Labs, sepsis work up, abx, admit

## 2020-02-01 NOTE — CONSULT NOTE ADULT - PROBLEM SELECTOR RECOMMENDATION 9
dementia - parkinsons - decubitus - aspiration - copd - atelectasis - frailty - weakness - dysphagia -  poss asp pna -   emp ABX  mrsa screen -   labs and imaging reviewed -   official CXR report pending, reviewed - will check CT chest  HOB elev - asp prec - oral hygiene -   skin care - decubitus care - wound care -   pain assessment  needs assist with all ADL and feeding  pt is DNR DNI - spoke with DTR - discussed GOC -   prognosis POOR
f/u blood cx  cxr without consolidation/infiltrate and no cough, not hypoxic does not appear to have a pna  check rvp  no diarrhea and abd benign  ua without pyuria  would get wound consult decubitus  can cont empiric antibx until blood cx back.  monitor vanc levels keep 10-15  monitor temp   would maintain strict aspiration precautions

## 2020-02-01 NOTE — H&P ADULT - ASSESSMENT
89 yr old with PMH of COPD (chronic obstructive pulmonary disease)  Decubital ulcer  sacrum, Dementia  Dysphagia  Former smoker  History of cholecystectomy  Parkinsons  Sick sinu,syndrome  Syncope.recently discharged from [facility] after tt for PNA to [facility] still febrile and is failure to thrive, In Ed febrile confused with baseline dementia with behav disorder and x ray suspicion of PNA started on vanc zosyn and admitted for further care , r/o sepsis, has decubitus ulcer present on admission 89 yr old with PMH of COPD (chronic obstructive pulmonary disease)  Decubital ulcer  sacrum, Dementia  Dysphagia  Former smoker  History of cholecystectomy  Parkinsons  Sick sinu,syndrome  Syncope.recently discharged from [facility] after tt for PNA to [facility] still febrile and is failure to thrive, In Ed febrile confused with baseline dementia with behav disorder and x ray suspicion of PNA started on vanc zosyn and admitted for further care , r/o sepsis, has decubitus ulcer present on admission, untageable, advanced dementia , frail , bed bound, poor po intake, , function quadriparesis needs hel with all ADL,    Advanced care planning discussed with patient/family. Advaced care planning forms reviewed,discussed /completed, 20 min spent  goals of care discussed with daughter Myranda health care proxy, pt is DNR DNI and hospice discussed.  80 minutes spent on this visit, 50% visit time spent in care co-ordination with other attendings and counselling patient

## 2020-02-01 NOTE — H&P ADULT - ATTENDING COMMENTS
75 minutes spent on this visit, 50% visit time spent in care co-ordination with other attendings and counselling patient 80 minutes spent on this visit, 50% visit time spent in care co-ordination with other attendings and counselling patient  I have discussed care plan with patient and HCP,expressed understanding of problems treatment and their effect and side effects, alternatives in detail,I have asked if they have any questions and concerns and appropriately addressed them to best of my ability  Reviewed all diagonostic tests, lab results and drug drug interactions, and medications

## 2020-02-01 NOTE — PHYSICAL THERAPY INITIAL EVALUATION ADULT - DISCHARGE DISPOSITION, PT EVAL
return to [facility] or LTC facility; pt not appropriate for P.T. in the hospital setting at this time, as pt is at her baseline functioning return to [facility] or LTC facility; pt not appropriate for P.T. in the hospital setting at this time, as pt is at her baseline functioning and is fully dependent for mobility

## 2020-02-01 NOTE — PHYSICAL THERAPY INITIAL EVALUATION ADULT - RANGE OF MOTION EXAMINATION, REHAB EVAL
pt presented with multiple contractures and otherwise limited ROM of her BUE and BLE including in her digits, wrists, elbows, shoulders, hips, knees, and ankles

## 2020-02-01 NOTE — H&P ADULT - NSICDXPASTMEDICALHX_GEN_ALL_CORE_FT
PAST MEDICAL HISTORY:  COPD (chronic obstructive pulmonary disease)     Decubital ulcer sacrum    Dementia     Dysphagia     Former smoker     History of cholecystectomy     Parkinsons     Sick sinus syndrome     Syncope

## 2020-02-01 NOTE — H&P ADULT - HISTORY OF PRESENT ILLNESS
89 yr old with PMH of COPD (chronic obstructive pulmonary disease)  Decubital ulcer  sacrum, Dementia  Dysphagia  Former smoker  History of cholecystectomy  Parkinsons  Sick sinu,syndrome  Syncope.recently discharged from [facility] after tt for PNA to [facility] still febrile and is failure to thrive, In Ed febrile confused with baseline dementia with behav disorder and x ray suspicion of PNA started on vanc zosyn and admitted for further care , r/o sepsis, has decubitus ulcer present on admission 89 yr old with PMH of COPD (chronic obstructive pulmonary disease)  Decubital ulcer  sacrum, Dementia  Dysphagia  Former smoker  History of cholecystectomy  Parkinsons  Sick sinu,syndrome  Syncope.recently discharged from [facility] after tt for PNA to [facility] still febrile and is failure to thrive, In Ed febrile confused with baseline dementia with behav disorder and x ray suspicion of PNA started on vanc zosyn and admitted for further care , r/o sepsis, has decubitus ulcer present on admission unstageable sacral.

## 2020-02-01 NOTE — ED PROVIDER NOTE - OBJECTIVE STATEMENT
89 year old female with a history dementia, dysphagia, COPD, Parkinson's 89 year old female with a history dementia, dysphagia, COPD, Parkinson's, presents with elevated WBC count, weakness, and fever.  Patient resides at [facility].  According to daughter, patient has been dehydrated, decreased PO intake with failure to thrive.  She was admitted to [facility] for 1 week for PNA, discharged 2 days ago.  Daughter requested patient be on hospice care yesterday.  She had blood work done at NH showing elevated WBC count and was noted to have fever in the ED.  Patient is DNR/DNI, she is unable to provide history.  PMD Dr Yaneth Muniz

## 2020-02-01 NOTE — SWALLOW BEDSIDE ASSESSMENT ADULT - SLP GENERAL OBSERVATIONS
Pt received in ED sleeping in bed, arousable with cues, non-verbal, poor command following, reduced cognition, daughter present, pain scale 0/10 via non verbal pain scale

## 2020-02-01 NOTE — CONSULT NOTE ADULT - ASSESSMENT
89f with dementia, parkinson, bedbound, decubitus ulcer  here with failure to thrive  found to have fever/ leukocytosis  sepsis v sirs

## 2020-02-01 NOTE — PHYSICAL THERAPY INITIAL EVALUATION ADULT - ADDITIONAL COMMENTS
The background information was taken from the pt's medical records, as pt has dementia and was unable to communicate. Pt lives in [facility] and is w/c bound. Pt's daughter has requested that she be placed on hospice care.

## 2020-02-01 NOTE — SWALLOW BEDSIDE ASSESSMENT ADULT - COMMENTS
Chart reviewed, order received for swallow eval.  Pt seen this morning for initial assessment of swallow.  Pt received in ED sleeping in bed, arousable with cues, non-verbal, poor command following, reduced cognition, daughter present, pain scale 0/10 via non verbal pain scale.  Pt left as received NAD RN notified call out to MD.  Will follow to check PO tolerance, as chest xray and chest CT pending.    As per charting, pt is a "89 yr old with PMH of COPD (chronic obstructive pulmonary disease)  Decubital ulcer  sacrum, Dementia  Dysphagia  Former smoker  History of cholecystectomy  Parkinsons  Sick sinu,syndrome  Syncope.recently discharged from [facility] after tt for PNA to [facility] still febrile and is failure to thrive, In Ed febrile confused with baseline dementia with behav disorder and x ray suspicion of PNA started on vanc zosyn and admitted for further care , r/o sepsis, has decubitus ulcer present on admission".

## 2020-02-01 NOTE — CONSULT NOTE ADULT - SUBJECTIVE AND OBJECTIVE BOX
[facility], Division of Infectious Diseases  ELIE Montoya A. Lee  [phone]    JOEL ORTIZ  89y, Female  [id number]    --  HPI: pt with dementia, history per chart and daughter at bedside, she is power of   89 yr old with PMH of COPD, bed sore, Dementia  Dysphagia , parkinsons, here with decreased oral intake.  Per daughter was told by physician at her facility to take to ED for furthre management of bed sore, and oral intake.  In ED pt febrile.  Daughter denies vomitng or diarrhea. Has not noticed a cough.  States mom was at [facility] last week and discharged 3 days ago, there she noticed the bed sore for the first time.  And pt was admitted for severe dehydration.  States just 3 days ago, she began the paper work for hospice and palliative care.        PMH/PSH--  Decubital ulcer  Former smoker  Dysphagia  Sick sinus syndrome  Syncope  Dementia  Parkinsons  COPD (chronic obstructive pulmonary disease)      Allergies--  NKDA    Medications--  Antibiotics: piperacillin/tazobactam IVPB.. 3.375 Gram(s) IV Intermittent every 8 hours  vancomycin  IVPB 1000 milliGRAM(s) IV Intermittent every 12 hours    Immunologic:   Other: acetaminophen  Suppository .. PRN  albuterol/ipratropium for Nebulization  carbidopa/levodopa  25/100  carbidopa/levodopa CR 50/200  enoxaparin Injectable  lactobacillus acidophilus  melatonin  QUEtiapine  rOPINIRole  sodium chloride 0.9%.      Social History--  EtOH: denies ***  Tobacco: denies ***  Drug Use: denies ***    Family/Marital History--    [place]    Remainder not relevant to clinical concern.    Travel/Environmental/Occupational History:  homemaker   now lives at [facility]    Review of Systems:    unable to obtain    Physical Exam--  Vital Signs: T(F): 99.3 (02-01-20 @ 12:30), Max: 101.3 (01-31-20 @ 22:32)  HR: 71 (02-01-20 @ 12:30)  BP: 102/45 (02-01-20 @ 12:30)  RR: 16 (02-01-20 @ 08:27)  SpO2: 100% (02-01-20 @ 12:30)  Wt(kg): --  General: Nontoxic-appearing Female in no acute distress.  HEENT: AT/NC.   Neck: Not rigid. No sense of mass.  Nodes: None palpable.  Lungs: noncomplitant with deep breath   Heart: Regular rate and rhythm. No Murmur.   Abdomen: Bowel sounds present and normoactive. Soft. Nondistended.   Extremities: No cyanosis or clubbing. No edema. contracted  Skin: Warm. Dry. Good turgor. No rash. No vasculitic stigmata.  Psychiatric: confused agitiated      Laboratory & Imaging Data--  CBC                        10.7   14.94 )-----------( 296      ( 31 Jan 2020 23:41 )             33.1       Chemistries  01-31    138  |  100  |  17  ----------------------------<  124<H>  4.0   |  31  |  0.69    Ca    8.3<L>      31 Jan 2020 23:41  Phos  3.5     02-01  Mg     1.8     02-01    TPro  6.0  /  Alb  1.8<L>  /  TBili  0.3  /  DBili  x   /  AST  37  /  ALT  22  /  AlkPhos  88  01-31      Culture Data        Urine Microscopic-Add On (NC) (01.31.20 @ 23:41)    Red Blood Cell - Urine: 3-5 /HPF    White Blood Cell - Urine: 3-5    Epithelial Cells: Occasional    < from: Xray Chest 1 View-PORTABLE IMMEDIATE (02.01.20 @ 00:06) >    EXAM:  XR CHEST PORTABLE IMMED 1V                                  PROCEDURE DATE:  02/01/2020          INTERPRETATION:  Clinical information: Sepsis.    Technique: Frontal view of the chest.    Comparison: Prior chest x-ray examination from 6/27/2017.    Findings: There is a cardiac pacemaker overlying the left chest wall.    The lungs are clear. The heart size is normal. Multilevel degenerative changes are noted within the imaged potions of the spine.    IMPRESSION: Clear lungs without interval change.    < end of copied text >      FLU A B RSV Detection by PCR (01.31.20 @ 23:45)    Flu A Result: NotDetec: The Flu A B RSV assay is a Real-Time PCR test for the qualitative  detection and differentiation of Influenza A, Influenza B, and  Respiratory Syncytial Virus on nasopharyngeal swabs. The results should  be interpreted in the context of all clinical and laboratory findings.    Flu B Result: NotDetec    RSV Result: NotDetec

## 2020-02-01 NOTE — CONSULT NOTE ADULT - SUBJECTIVE AND OBJECTIVE BOX
Date/Time Patient Seen:  		  Referring MD:   Data Reviewed	       Patient is a 89y old  Female who presents with a chief complaint of fever (01 Feb 2020 00:52)      Subjective/HPI    in bed  seen and examined  vs and meds reviewed  labs reviewed  spoke with DTR    old records reviewed  transferred to [facility]    advanced Dementia    89 yr old with PMH of COPD (chronic obstructive pulmonary disease)  Decubital ulcer  sacrum, Dementia  Dysphagia  Former smoker  History of cholecystectomy  Parkinsons  Sick sinu,syndrome  Syncope.recently discharged from [facility] after tt for PNA to [facility] still febrile and is failure to thrive, In Ed febrile confused with baseline dementia with behav disorder and x ray suspicion of PNA started on vanc zosyn and admitted for further care , r/o sepsis, has decubitus ulcer present on admission     Child Abuse Assessment (patients less than 13 yrs):  Chief Complaint: abnormal lab result.    · Chief Complaint: The patient is a 89y Female complaining of abnormal lab result.  · HPI Objective Statement: 89 year old female with a history dementia, dysphagia, COPD, Parkinson's, presents with elevated WBC count, weakness, and fever.  Patient resides at [facility].  According to daughter, patient has been dehydrated, decreased PO intake with failure to thrive.  She was admitted to [facility] for 1 week for PNA, discharged 2 days ago.  Daughter requested patient be on hospice care yesterday.  She had blood work done at NH showing elevated WBC count and was noted to have fever in the ED.  Patient is DNR/DNI, she is unable to provide history.  PMD Dr Yaneth Muniz    · Presenting Symptoms: DECREASED EATING/DRINKING, weakness, domonique loss, FTT  · Negative Findings: no cough, no diarrhea, no vomiting  · Timing: gradual onset  · Duration: week(s)  1   · Severity: SEVERE   · Context: unknown  · Recent Exposure To: none known, recently admitted for PNA  · Relieving Factors: none      Dementia, Parkinson's, COPD, HTN, seizures, syncope. 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 S/P PPM     Social History:  · Marital Status	  · Lives With	children     Alcohol Use History:  · Have you ever consumed alcohol	never     Tobacco Usage:  · Tobacco Usage: Former smoker  · Tobacco Type: cigarettes  · Number of Packs per Day: 1  · Number of yrs: 10  · Pack yrs: 10     Family History:  No pertinent family history in first degree relatives.     PAST MEDICAL & SURGICAL HISTORY:  Decubital ulcer: sacrum  Former smoker  Dysphagia  Sick sinus syndrome  Syncope  Dementia  Parkinsons  COPD (chronic obstructive pulmonary disease)  History of cholecystectomy  History of cholecystectomy        Medication list         MEDICATIONS  (STANDING):  carbidopa/levodopa  25/100 1.5 Tablet(s) Oral <User Schedule>  carbidopa/levodopa CR 50/200 1 Tablet(s) Oral <User Schedule>  enoxaparin Injectable 40 milliGRAM(s) SubCutaneous daily  lactobacillus acidophilus 1 Tablet(s) Oral two times a day with meals  melatonin 3 milliGRAM(s) Oral at bedtime  piperacillin/tazobactam IVPB.. 3.375 Gram(s) IV Intermittent every 8 hours  QUEtiapine 25 milliGRAM(s) Oral at bedtime  rOPINIRole 2 milliGRAM(s) Oral three times a day  sodium chloride 0.9%. 1000 milliLiter(s) (50 mL/Hr) IV Continuous <Continuous>  vancomycin  IVPB 1000 milliGRAM(s) IV Intermittent every 12 hours    MEDICATIONS  (PRN):  acetaminophen  Suppository .. 650 milliGRAM(s) Rectal every 6 hours PRN Temp greater or equal to 38C (100.4F), Mild Pain (1 - 3), Moderate Pain (4 - 6)         Vitals log        ICU Vital Signs Last 24 Hrs  T(C): 36.5 (01 Feb 2020 06:00), Max: 38.5 (31 Jan 2020 22:32)  T(F): 97.7 (01 Feb 2020 06:00), Max: 101.3 (31 Jan 2020 22:32)  HR: 67 (01 Feb 2020 06:00) (67 - 87)  BP: 118/55 (01 Feb 2020 06:00) (108/53 - 130/80)  BP(mean): --  ABP: --  ABP(mean): --  RR: 16 (01 Feb 2020 06:00) (16 - 28)  SpO2: 100% (01 Feb 2020 06:00) (94% - 100%)           Input and Output:  I&O's Detail    31 Jan 2020 07:01  -  01 Feb 2020 06:40  --------------------------------------------------------  IN:    Sodium Chloride 0.9% IV Bolus: 1550 mL  Total IN: 1550 mL    OUT:    Intermittent Catheterization - Urethral: 500 mL  Total OUT: 500 mL    Total NET: 1050 mL          Lab Data                        10.7   14.94 )-----------( 296      ( 31 Jan 2020 23:41 )             33.1     01-31    138  |  100  |  17  ----------------------------<  124<H>  4.0   |  31  |  0.69    Ca    8.3<L>      31 Jan 2020 23:41    TPro  6.0  /  Alb  1.8<L>  /  TBili  0.3  /  DBili  x   /  AST  37  /  ALT  22  /  AlkPhos  88  01-31            Review of Systems	  frail  weak  sob  byrd  confused      Objective     Physical Examination    heart 1s2  lung dec BS  abd soft  head nc  head at  contractures on hand      Pertinent Lab findings & Imaging      [place]:  NO   Adequate UO     I&O's Detail    31 Jan 2020 07:01  -  01 Feb 2020 06:40  --------------------------------------------------------  IN:    Sodium Chloride 0.9% IV Bolus: 1550 mL  Total IN: 1550 mL    OUT:    Intermittent Catheterization - Urethral: 500 mL  Total OUT: 500 mL    Total NET: 1050 mL               Discussed with:     Cultures:	        Radiology        EXAM:  CHEST-PORTABLE URGENT                            PROCEDURE DATE:  06/27/2017            INTERPRETATION:  INDICATION: Line placement. Pacemaker placement.      COMPARISON: None available.      FINDINGS:      Lines and Tubes: A dual-lead pacemaker is in place.      Lungs: The lungs are clear.      Pleura: No pleural effusion or pneumothorax.      Heart and Mediastinum: The heart is normal in size.  The aorta is   unremarkable.      Skeletal: Unremarkable.        IMPRESSION:    1.  The lungs are clear.  2.  Dual-lead pacemaker in place.                    KAYLI CARROLL M.D., ATTENDING RADIOLOGIST  This document has been electronically signed. Jun 28 2017  2:18PM

## 2020-02-02 ENCOUNTER — TRANSCRIPTION ENCOUNTER (OUTPATIENT)
Age: 85
End: 2020-02-02

## 2020-02-02 LAB
ANION GAP SERPL CALC-SCNC: 10 MMOL/L — SIGNIFICANT CHANGE UP (ref 5–17)
BASOPHILS # BLD AUTO: 0.04 K/UL — SIGNIFICANT CHANGE UP (ref 0–0.2)
BASOPHILS NFR BLD AUTO: 0.3 % — SIGNIFICANT CHANGE UP (ref 0–2)
BUN SERPL-MCNC: 14 MG/DL — SIGNIFICANT CHANGE UP (ref 7–23)
CALCIUM SERPL-MCNC: 8.1 MG/DL — LOW (ref 8.4–10.5)
CHLORIDE SERPL-SCNC: 107 MMOL/L — SIGNIFICANT CHANGE UP (ref 96–108)
CO2 SERPL-SCNC: 22 MMOL/L — SIGNIFICANT CHANGE UP (ref 22–31)
CREAT SERPL-MCNC: 0.58 MG/DL — SIGNIFICANT CHANGE UP (ref 0.5–1.3)
CULTURE RESULTS: NO GROWTH — SIGNIFICANT CHANGE UP
EOSINOPHIL # BLD AUTO: 0.1 K/UL — SIGNIFICANT CHANGE UP (ref 0–0.5)
EOSINOPHIL NFR BLD AUTO: 0.8 % — SIGNIFICANT CHANGE UP (ref 0–6)
GLUCOSE SERPL-MCNC: 90 MG/DL — SIGNIFICANT CHANGE UP (ref 70–99)
HCT VFR BLD CALC: 26.4 % — LOW (ref 34.5–45)
HGB BLD-MCNC: 8.4 G/DL — LOW (ref 11.5–15.5)
IMM GRANULOCYTES NFR BLD AUTO: 0.6 % — SIGNIFICANT CHANGE UP (ref 0–1.5)
LYMPHOCYTES # BLD AUTO: 17.5 % — SIGNIFICANT CHANGE UP (ref 13–44)
LYMPHOCYTES # BLD AUTO: 2.12 K/UL — SIGNIFICANT CHANGE UP (ref 1–3.3)
MAGNESIUM SERPL-MCNC: 1.8 MG/DL — SIGNIFICANT CHANGE UP (ref 1.6–2.6)
MCHC RBC-ENTMCNC: 29.1 PG — SIGNIFICANT CHANGE UP (ref 27–34)
MCHC RBC-ENTMCNC: 31.8 GM/DL — LOW (ref 32–36)
MCV RBC AUTO: 91.3 FL — SIGNIFICANT CHANGE UP (ref 80–100)
MONOCYTES # BLD AUTO: 1.02 K/UL — HIGH (ref 0–0.9)
MONOCYTES NFR BLD AUTO: 8.4 % — SIGNIFICANT CHANGE UP (ref 2–14)
NEUTROPHILS # BLD AUTO: 8.74 K/UL — HIGH (ref 1.8–7.4)
NEUTROPHILS NFR BLD AUTO: 72.4 % — SIGNIFICANT CHANGE UP (ref 43–77)
NRBC # BLD: 0 /100 WBCS — SIGNIFICANT CHANGE UP (ref 0–0)
PHOSPHATE SERPL-MCNC: 3.5 MG/DL — SIGNIFICANT CHANGE UP (ref 2.5–4.5)
PLATELET # BLD AUTO: 225 K/UL — SIGNIFICANT CHANGE UP (ref 150–400)
POTASSIUM SERPL-MCNC: 3.7 MMOL/L — SIGNIFICANT CHANGE UP (ref 3.5–5.3)
POTASSIUM SERPL-SCNC: 3.7 MMOL/L — SIGNIFICANT CHANGE UP (ref 3.5–5.3)
PREALB SERPL-MCNC: 6 MG/DL — LOW (ref 20–40)
RAPID RVP RESULT: SIGNIFICANT CHANGE UP
RBC # BLD: 2.89 M/UL — LOW (ref 3.8–5.2)
RBC # FLD: 13.5 % — SIGNIFICANT CHANGE UP (ref 10.3–14.5)
SODIUM SERPL-SCNC: 139 MMOL/L — SIGNIFICANT CHANGE UP (ref 135–145)
SPECIMEN SOURCE: SIGNIFICANT CHANGE UP
WBC # BLD: 12.09 K/UL — HIGH (ref 3.8–10.5)
WBC # FLD AUTO: 12.09 K/UL — HIGH (ref 3.8–10.5)

## 2020-02-02 RX ADMIN — Medication 1 TABLET(S): at 08:45

## 2020-02-02 RX ADMIN — ENOXAPARIN SODIUM 40 MILLIGRAM(S): 100 INJECTION SUBCUTANEOUS at 12:10

## 2020-02-02 RX ADMIN — Medication 1 APPLICATION(S): at 17:20

## 2020-02-02 RX ADMIN — Medication 3 MILLILITER(S): at 22:31

## 2020-02-02 RX ADMIN — CARBIDOPA AND LEVODOPA 1.5 TABLET(S): 25; 100 TABLET ORAL at 06:58

## 2020-02-02 RX ADMIN — Medication 3 MILLILITER(S): at 08:00

## 2020-02-02 RX ADMIN — ROPINIROLE 2 MILLIGRAM(S): 8 TABLET, FILM COATED, EXTENDED RELEASE ORAL at 05:27

## 2020-02-02 RX ADMIN — SODIUM CHLORIDE 50 MILLILITER(S): 9 INJECTION INTRAMUSCULAR; INTRAVENOUS; SUBCUTANEOUS at 20:41

## 2020-02-02 RX ADMIN — QUETIAPINE FUMARATE 25 MILLIGRAM(S): 200 TABLET, FILM COATED ORAL at 21:42

## 2020-02-02 RX ADMIN — CARBIDOPA AND LEVODOPA 1.5 TABLET(S): 25; 100 TABLET ORAL at 12:09

## 2020-02-02 RX ADMIN — PIPERACILLIN AND TAZOBACTAM 25 GRAM(S): 4; .5 INJECTION, POWDER, LYOPHILIZED, FOR SOLUTION INTRAVENOUS at 17:19

## 2020-02-02 RX ADMIN — Medication 250 MILLIGRAM(S): at 12:09

## 2020-02-02 RX ADMIN — ROPINIROLE 2 MILLIGRAM(S): 8 TABLET, FILM COATED, EXTENDED RELEASE ORAL at 21:43

## 2020-02-02 RX ADMIN — PIPERACILLIN AND TAZOBACTAM 25 GRAM(S): 4; .5 INJECTION, POWDER, LYOPHILIZED, FOR SOLUTION INTRAVENOUS at 08:45

## 2020-02-02 RX ADMIN — Medication 1 APPLICATION(S): at 05:26

## 2020-02-02 RX ADMIN — PIMAVANSERIN TARTRATE 34 MILLIGRAM(S): 10 TABLET, COATED ORAL at 21:42

## 2020-02-02 RX ADMIN — ROPINIROLE 2 MILLIGRAM(S): 8 TABLET, FILM COATED, EXTENDED RELEASE ORAL at 13:17

## 2020-02-02 RX ADMIN — CARBIDOPA AND LEVODOPA 1 TABLET(S): 25; 100 TABLET ORAL at 20:41

## 2020-02-02 RX ADMIN — Medication 3 MILLILITER(S): at 15:03

## 2020-02-02 RX ADMIN — RIVASTIGMINE 6 MILLIGRAM(S): 4.6 PATCH, EXTENDED RELEASE TRANSDERMAL at 05:26

## 2020-02-02 RX ADMIN — Medication 3 MILLIGRAM(S): at 21:44

## 2020-02-02 RX ADMIN — Medication 1 TABLET(S): at 17:19

## 2020-02-02 RX ADMIN — PIPERACILLIN AND TAZOBACTAM 25 GRAM(S): 4; .5 INJECTION, POWDER, LYOPHILIZED, FOR SOLUTION INTRAVENOUS at 00:17

## 2020-02-02 RX ADMIN — RIVASTIGMINE 6 MILLIGRAM(S): 4.6 PATCH, EXTENDED RELEASE TRANSDERMAL at 17:19

## 2020-02-02 NOTE — PROGRESS NOTE ADULT - ASSESSMENT
89 yr old with PMH of COPD (chronic obstructive pulmonary disease)  Decubital ulcer  sacrum, Dementia  Dysphagia  Former smoker  History of cholecystectomy  Parkinsons  Sick sinu,syndrome  Syncope.recently discharged from [facility] after tt for PNA to [facility] still febrile and is failure to thrive, In Ed febrile confused with baseline dementia with behav disorder and x ray suspicion of PNA started on vanc zosyn and admitted for further care , r/o sepsis, has decubitus ulcer present on admission, untageable, advanced dementia , frail , bed bound, poor po intake, , function quadriparesis needs hel with all ADL,    Advanced care planning discussed with patient/family. Advaced care planning forms reviewed,discussed /completed, 20 min spent  goals of care discussed with daughter Myranda health care proxy, pt is DNR DNI and hospice discussed.  45 minutes spent on this visit, 50% visit time spent in care co-ordination with other attendings and counselling patient

## 2020-02-03 RX ORDER — ZINC OXIDE 200 MG/G
1 OINTMENT TOPICAL THREE TIMES A DAY
Refills: 0 | Status: DISCONTINUED | OUTPATIENT
Start: 2020-02-03 | End: 2020-02-04

## 2020-02-03 RX ADMIN — RIVASTIGMINE 6 MILLIGRAM(S): 4.6 PATCH, EXTENDED RELEASE TRANSDERMAL at 05:58

## 2020-02-03 RX ADMIN — Medication 3 MILLILITER(S): at 16:09

## 2020-02-03 RX ADMIN — CARBIDOPA AND LEVODOPA 1.5 TABLET(S): 25; 100 TABLET ORAL at 11:32

## 2020-02-03 RX ADMIN — QUETIAPINE FUMARATE 25 MILLIGRAM(S): 200 TABLET, FILM COATED ORAL at 21:42

## 2020-02-03 RX ADMIN — Medication 1 TABLET(S): at 09:41

## 2020-02-03 RX ADMIN — Medication 3 MILLILITER(S): at 07:53

## 2020-02-03 RX ADMIN — PIPERACILLIN AND TAZOBACTAM 25 GRAM(S): 4; .5 INJECTION, POWDER, LYOPHILIZED, FOR SOLUTION INTRAVENOUS at 16:56

## 2020-02-03 RX ADMIN — ZINC OXIDE 1 APPLICATION(S): 200 OINTMENT TOPICAL at 11:33

## 2020-02-03 RX ADMIN — Medication 3 MILLIGRAM(S): at 21:43

## 2020-02-03 RX ADMIN — ENOXAPARIN SODIUM 40 MILLIGRAM(S): 100 INJECTION SUBCUTANEOUS at 11:32

## 2020-02-03 RX ADMIN — ROPINIROLE 2 MILLIGRAM(S): 8 TABLET, FILM COATED, EXTENDED RELEASE ORAL at 21:43

## 2020-02-03 RX ADMIN — Medication 1 APPLICATION(S): at 05:57

## 2020-02-03 RX ADMIN — CARBIDOPA AND LEVODOPA 1.5 TABLET(S): 25; 100 TABLET ORAL at 06:03

## 2020-02-03 RX ADMIN — PIPERACILLIN AND TAZOBACTAM 25 GRAM(S): 4; .5 INJECTION, POWDER, LYOPHILIZED, FOR SOLUTION INTRAVENOUS at 08:34

## 2020-02-03 RX ADMIN — Medication 1 TABLET(S): at 16:57

## 2020-02-03 RX ADMIN — PIPERACILLIN AND TAZOBACTAM 25 GRAM(S): 4; .5 INJECTION, POWDER, LYOPHILIZED, FOR SOLUTION INTRAVENOUS at 01:06

## 2020-02-03 RX ADMIN — ROPINIROLE 2 MILLIGRAM(S): 8 TABLET, FILM COATED, EXTENDED RELEASE ORAL at 13:19

## 2020-02-03 RX ADMIN — ROPINIROLE 2 MILLIGRAM(S): 8 TABLET, FILM COATED, EXTENDED RELEASE ORAL at 05:58

## 2020-02-03 RX ADMIN — PIMAVANSERIN TARTRATE 34 MILLIGRAM(S): 10 TABLET, COATED ORAL at 21:43

## 2020-02-03 RX ADMIN — SODIUM CHLORIDE 50 MILLILITER(S): 9 INJECTION INTRAMUSCULAR; INTRAVENOUS; SUBCUTANEOUS at 16:55

## 2020-02-03 RX ADMIN — Medication 3 MILLILITER(S): at 23:30

## 2020-02-03 RX ADMIN — RIVASTIGMINE 6 MILLIGRAM(S): 4.6 PATCH, EXTENDED RELEASE TRANSDERMAL at 17:16

## 2020-02-03 RX ADMIN — CARBIDOPA AND LEVODOPA 1 TABLET(S): 25; 100 TABLET ORAL at 20:36

## 2020-02-03 NOTE — GOALS OF CARE CONVERSATION - ADVANCED CARE PLANNING - CONVERSATION DETAILS
Discussed advance directives with samira daughter who states that she knows pt would not want CPR or intubation.

## 2020-02-03 NOTE — ADVANCED PRACTICE NURSE CONSULT - ASSESSMENT
Patient is a 89 yo bedbound, non-verbal, contracted female accepted into hospice care presents with an 8.5 x 5 concaved unstageable pressure injury etiology likely 2/2 moisture skin integrity intact, no bogginess noted, no odor, no warmth, no erythema, no s/s pain/discomfort on palpation

## 2020-02-03 NOTE — DIETITIAN INITIAL EVALUATION ADULT. - OTHER INFO
89 yr old with PMH of COPD, Dementia, Dysphagia, FTT, and Parkinsons admitted with dx PNA from [facility].  Nutrition consult ordered for pressure ulcers (unstage to sacrum, stage I to R heel).  Pt seen for SLP evaluation 2/1, recommendation for Dysphagia 1 pureed consistency with nectar thick fluids via tsp.  Per transfer documents, pt on same diet/fluid consistency at [facility].  Pt's son at bedside during time of visit, reports good po intake at breakfast; pt is full assist with meals.  Son states pt was also receiving Ensure Enlive BID PTA as pt has variable po intake, states pt's nutritional status has been declining and endorses weight loss but is unable to quantify amount or time frame.  NFPE exam performed- pt p/w subcutaneous fat loss to: orbitals (moderate), triceps (moderate); muscle wasting to: temples (moderate), clavicles (moderate), thigh (moderate).  Based on weight loss, < prealbumin, increased energy/protein needs (pressure ulcers), physical assessment findings, FTT dx, underweight BMI for age, <75% of estimated energy requirement for >1 month, pt meets clinical characteristics of moderate protein-calorie malnutrition in context of chronic illness.

## 2020-02-03 NOTE — ADVANCED PRACTICE NURSE CONSULT - RECOMMEDATIONS
1. Zinc TID and PRN for soiling  RN educated in the care of this patients wound care  MD made aware of recommendations orders written

## 2020-02-03 NOTE — CHART NOTE - NSCHARTNOTEFT_GEN_A_CORE
spoke with Dtr - Myranda -   discussed GOC   discussed Hospice referral   Dtr is the power of  - and is interested - Hospice referral -   will follow

## 2020-02-03 NOTE — CHART NOTE - NSCHARTNOTEFT_GEN_A_CORE
Upon Nutritional Assessment by the Registered Dietitian your patient was determined to meet criteria / has evidence of the following diagnosis/diagnoses:          [ ]  Mild Protein Calorie Malnutrition        [x ]  Moderate Protein Calorie Malnutrition in context of chronic illness         [ ] Severe Protein Calorie Malnutrition        [ ] Unspecified Protein Calorie Malnutrition        [ ] Underweight / BMI <19        [ ] Morbid Obesity / BMI > 40      Findings as based on:  •  Comprehensive nutrition assessment and consultation  •  Calorie counts (nutrient intake analysis)  •  Food acceptance and intake status from observations by staff  •  Follow up  •  Patient education  •  Intervention secondary to interdisciplinary rounds  •   concerns    89 yr old with PMH of COPD, Dementia, Dysphagia, FTT, and Parkinsons admitted with dx PNA from [facility].  Nutrition consult ordered for pressure ulcers (unstage to sacrum, stage I to R heel).  Pt seen for SLP evaluation 2/1, recommendation for Dysphagia 1 pureed consistency with nectar thick fluids via tsp.  Per transfer documents, pt on same diet/fluid consistency at [facility].  Pt's son at bedside during time of visit, reports good po intake at breakfast; pt is full assist with meals.  Son states pt was also receiving Ensure Enlive BID PTA as pt has variable po intake, states pt's nutritional status has been declining and endorses weight loss but is unable to quantify amount or time frame.  NFPE exam performed- pt p/w subcutaneous fat loss to: orbitals (moderate), triceps (moderate); muscle wasting to: temples (moderate), clavicles (moderate), thigh (moderate).  Based on weight loss, < prealbumin, increased energy/protein needs (pressure ulcers), physical assessment findings, FTT dx, underweight BMI for age, <75% of estimated energy requirement for >1 month, pt meets clinical characteristics of moderate protein-calorie malnutrition in context of chronic illness.    Treatment:  Dysphagia 1 pureed consistency with nectar thick fluids via tsp, aspiration precautions, add nutritional supplement, honor food preferences, provide energy/protein dense snacks - magic cup fortified ice cream  The following diet has been recommended: Dysphagia 1 pureed consistency with nectar thick fluid with Ensure Enlive BID      PROVIDER Section:     By signing this assessment you are acknowledging and agree with the diagnosis/diagnoses assigned by the Registered Dietitian    Comments:

## 2020-02-03 NOTE — PROGRESS NOTE ADULT - ASSESSMENT
89 yr old with PMH of COPD (chronic obstructive pulmonary disease)  Decubital ulcer  sacrum, Dementia  Dysphagia  Former smoker  History of cholecystectomy  Parkinsons  Sick sinu,syndrome  Syncope.recently discharged from [facility] after tt for PNA to [facility] still febrile and is failure to thrive, In Ed febrile confused with baseline dementia with behav disorder and x ray suspicion of PNA started on vanc zosyn and admitted for further care , r/o sepsis, has decubitus ulcer present on admission, untageable, advanced dementia , frail , bed bound, poor po intake, , function quadriparesis needs help with all ADL,family wants comfort measures, reffered for palliative care consult , update molst for comfort measures and hospice refferal.    Advanced care planning discussed with patient/family. Advaced care planning forms reviewed,discussed /completed, 20 min spent  goals of care discussed with daughter Myranda health care proxy, pt is DNR DNI and hospice discussed.  45 minutes spent on this visit, 50% visit time spent in care co-ordination with other attendings and counselling patient

## 2020-02-03 NOTE — GOALS OF CARE CONVERSATION - ADVANCED CARE PLANNING - NS PRO AD PATIENT TYPE ON CHART
Health Care Proxy (HCP)/Medical Orders for Life-Sustaining Treatment (MOLST)/Power of  (POA) for Healthcare Issues/Living Will

## 2020-02-04 PROCEDURE — 87631 RESP VIRUS 3-5 TARGETS: CPT

## 2020-02-04 PROCEDURE — 85610 PROTHROMBIN TIME: CPT

## 2020-02-04 PROCEDURE — 71045 X-RAY EXAM CHEST 1 VIEW: CPT

## 2020-02-04 PROCEDURE — 87640 STAPH A DNA AMP PROBE: CPT

## 2020-02-04 PROCEDURE — 84134 ASSAY OF PREALBUMIN: CPT

## 2020-02-04 PROCEDURE — 97161 PT EVAL LOW COMPLEX 20 MIN: CPT

## 2020-02-04 PROCEDURE — 87633 RESP VIRUS 12-25 TARGETS: CPT

## 2020-02-04 PROCEDURE — 80048 BASIC METABOLIC PNL TOTAL CA: CPT

## 2020-02-04 PROCEDURE — 85730 THROMBOPLASTIN TIME PARTIAL: CPT

## 2020-02-04 PROCEDURE — 84100 ASSAY OF PHOSPHORUS: CPT

## 2020-02-04 PROCEDURE — 87581 M.PNEUMON DNA AMP PROBE: CPT

## 2020-02-04 PROCEDURE — 87641 MR-STAPH DNA AMP PROBE: CPT

## 2020-02-04 PROCEDURE — 87040 BLOOD CULTURE FOR BACTERIA: CPT

## 2020-02-04 PROCEDURE — 80053 COMPREHEN METABOLIC PANEL: CPT

## 2020-02-04 PROCEDURE — 94760 N-INVAS EAR/PLS OXIMETRY 1: CPT

## 2020-02-04 PROCEDURE — 94640 AIRWAY INHALATION TREATMENT: CPT

## 2020-02-04 PROCEDURE — 99285 EMERGENCY DEPT VISIT HI MDM: CPT

## 2020-02-04 PROCEDURE — 83605 ASSAY OF LACTIC ACID: CPT

## 2020-02-04 PROCEDURE — 93005 ELECTROCARDIOGRAM TRACING: CPT

## 2020-02-04 PROCEDURE — 87486 CHLMYD PNEUM DNA AMP PROBE: CPT

## 2020-02-04 PROCEDURE — 85027 COMPLETE CBC AUTOMATED: CPT

## 2020-02-04 PROCEDURE — 71250 CT THORAX DX C-: CPT

## 2020-02-04 PROCEDURE — 87798 DETECT AGENT NOS DNA AMP: CPT

## 2020-02-04 PROCEDURE — 92610 EVALUATE SWALLOWING FUNCTION: CPT

## 2020-02-04 PROCEDURE — 87086 URINE CULTURE/COLONY COUNT: CPT

## 2020-02-04 PROCEDURE — 82803 BLOOD GASES ANY COMBINATION: CPT

## 2020-02-04 PROCEDURE — 83735 ASSAY OF MAGNESIUM: CPT

## 2020-02-04 PROCEDURE — 36415 COLL VENOUS BLD VENIPUNCTURE: CPT

## 2020-02-04 PROCEDURE — 81001 URINALYSIS AUTO W/SCOPE: CPT

## 2020-02-04 RX ORDER — HYDROMORPHONE HYDROCHLORIDE 2 MG/ML
0.5 INJECTION INTRAMUSCULAR; INTRAVENOUS; SUBCUTANEOUS
Refills: 0 | Status: DISCONTINUED | OUTPATIENT
Start: 2020-02-04 | End: 2020-02-05

## 2020-02-04 RX ORDER — ATROPINE SULFATE 1 %
3 DROPS OPHTHALMIC (EYE)
Refills: 0 | Status: DISCONTINUED | OUTPATIENT
Start: 2020-02-04 | End: 2020-02-05

## 2020-02-04 RX ORDER — MORPHINE SULFATE 50 MG/1
5 CAPSULE, EXTENDED RELEASE ORAL
Refills: 0 | Status: DISCONTINUED | OUTPATIENT
Start: 2020-02-04 | End: 2020-02-05

## 2020-02-04 RX ADMIN — ENOXAPARIN SODIUM 40 MILLIGRAM(S): 100 INJECTION SUBCUTANEOUS at 11:50

## 2020-02-04 RX ADMIN — PIPERACILLIN AND TAZOBACTAM 25 GRAM(S): 4; .5 INJECTION, POWDER, LYOPHILIZED, FOR SOLUTION INTRAVENOUS at 08:03

## 2020-02-04 RX ADMIN — SODIUM CHLORIDE 50 MILLILITER(S): 9 INJECTION INTRAMUSCULAR; INTRAVENOUS; SUBCUTANEOUS at 11:49

## 2020-02-04 RX ADMIN — RIVASTIGMINE 6 MILLIGRAM(S): 4.6 PATCH, EXTENDED RELEASE TRANSDERMAL at 05:39

## 2020-02-04 RX ADMIN — ROPINIROLE 2 MILLIGRAM(S): 8 TABLET, FILM COATED, EXTENDED RELEASE ORAL at 15:01

## 2020-02-04 RX ADMIN — CARBIDOPA AND LEVODOPA 1.5 TABLET(S): 25; 100 TABLET ORAL at 05:43

## 2020-02-04 RX ADMIN — Medication 3 MILLILITER(S): at 07:57

## 2020-02-04 RX ADMIN — CARBIDOPA AND LEVODOPA 1.5 TABLET(S): 25; 100 TABLET ORAL at 11:49

## 2020-02-04 RX ADMIN — ZINC OXIDE 1 APPLICATION(S): 200 OINTMENT TOPICAL at 05:38

## 2020-02-04 RX ADMIN — PIPERACILLIN AND TAZOBACTAM 25 GRAM(S): 4; .5 INJECTION, POWDER, LYOPHILIZED, FOR SOLUTION INTRAVENOUS at 01:23

## 2020-02-04 RX ADMIN — Medication 1 TABLET(S): at 08:05

## 2020-02-04 RX ADMIN — ROPINIROLE 2 MILLIGRAM(S): 8 TABLET, FILM COATED, EXTENDED RELEASE ORAL at 05:39

## 2020-02-04 NOTE — PROGRESS NOTE ADULT - ASSESSMENT
89 yr old with PMH of COPD (chronic obstructive pulmonary disease)  Decubital ulcer  sacrum, Dementia  Dysphagia  Former smoker  History of cholecystectomy  Parkinsons  Sick sinu,syndrome  Syncope.recently discharged from [facility] after tt for PNA to [facility] still febrile and is failure to thrive, In Ed febrile confused with baseline dementia with behav disorder and x ray suspicion of PNA started on vanc zosyn and admitted for further care , r/o sepsis, has decubitus ulcer present on admission, untageable, advanced dementia , frail , bed bound , poor po intake, , function quadriparesis needs help with all ADL,family wants comfort measures, reffered for palliative care consult , update mol for comfort measures and hospice refferal.on Iv abx , Id consult noted likely viral syndrome, cultures negative,linear atelectasis with pl effusion PNA ruled out.dehydration improved with IV fluids, failure to thrive with end stage dementia. ID f up   d/w son by bed side    Advanced care planning discussed with patient/family. Advaced care planning forms reviewed,discussed /completed, 20 min spent  goals of care discussed with daughter Myranda health care proxy, pt is DNR DNI and hospice discussed.  45 minutes spent on this visit, 50% visit time spent in care co-ordination with other attendings and counselling patient 89 yr old with PMH of COPD (chronic obstructive pulmonary disease)  Decubital ulcer  sacrum, Dementia  Dysphagia  Former smoker  History of cholecystectomy  Parkinsons  Sick sinu,syndrome  Syncope.recently discharged from [facility] after tt for PNA to [facility] still febrile and is failure to thrive, In Ed febrile confused with baseline dementia with behav disorder and x ray suspicion of PNA started on vanc zosyn and admitted for further care , r/o sepsis, has decubitus ulcer present on admission, untageable, advanced dementia , frail , bed bound , poor po intake, , function quadriparesis needs help with all ADL,family wants comfort measures, reffered for palliative care consult , update molst for comfort measures and hospice refferal.on Iv abx , Id consult noted likely viral syndrome, cultures negative,linear atelectasis with pl effusion PNA ruled out.sepsis ruled out.dehydration improved with IV fluids, failure to thrive with end stage dementia. ID f up   d/w son by bed side    Advanced care planning discussed with patient/family. Advaced care planning forms reviewed,discussed /completed, 20 min spent  goals of care discussed with daughter Myranda health care proxy, pt is DNR DNI and hospice discussed.  45 minutes spent on this visit, 50% visit time spent in care co-ordination with other attendings and counselling patient

## 2020-02-05 ENCOUNTER — TRANSCRIPTION ENCOUNTER (OUTPATIENT)
Age: 85
End: 2020-02-05

## 2020-02-05 VITALS
TEMPERATURE: 99 F | HEART RATE: 75 BPM | OXYGEN SATURATION: 98 % | DIASTOLIC BLOOD PRESSURE: 59 MMHG | SYSTOLIC BLOOD PRESSURE: 126 MMHG | RESPIRATION RATE: 19 BRPM

## 2020-02-05 DIAGNOSIS — R62.7 ADULT FAILURE TO THRIVE: ICD-10-CM

## 2020-02-05 RX ORDER — MORPHINE SULFATE 50 MG/1
0.25 CAPSULE, EXTENDED RELEASE ORAL
Qty: 0 | Refills: 0 | DISCHARGE
Start: 2020-02-05

## 2020-02-05 RX ORDER — ATROPINE SULFATE 1 %
1 DROPS OPHTHALMIC (EYE)
Qty: 0 | Refills: 0 | DISCHARGE
Start: 2020-02-05

## 2020-02-05 RX ORDER — HYDROMORPHONE HYDROCHLORIDE 2 MG/ML
1 INJECTION INTRAMUSCULAR; INTRAVENOUS; SUBCUTANEOUS
Qty: 0 | Refills: 0 | DISCHARGE
Start: 2020-02-05

## 2020-02-05 NOTE — DISCHARGE NOTE PROVIDER - HOSPITAL COURSE
89 yr old with PMH of COPD (chronic obstructive pulmonary disease)  Decubital ulcer  sacrum, Dementia  Dysphagia  Former smoker  History of cholecystectomy  Parkinsons  Sick sinu,syndrome  Syncope.recently discharged from [facility] after tt for PNA to [facility] still febrile and is failure to thrive, In Ed febrile confused with baseline dementia with behav disorder and x ray suspicion of PNA started on vanc zosyn and admitted for further care , r/o sepsis, has decubitus ulcer present on admission, untageable, advanced dementia , frail , bed bound , poor po intake, , function quadriparesis needs help with all ADL,family wants comfort measures, reffered for palliative care consult , update molst for comfort measures and hospice refferal.stopped Iv abx , Id consult noted likely viral syndrome, cultures negative,linear atelectasis with pl effusion PNA ruled out.sepsis ruled out.dehydration improved with IV fluids, orophryngeal dysphagia,failure to thrive with end stage dementia. failure to thrive sacral decub unstageable.started on comfort measures and reffered to hospice.    d/w son by bed side        Advanced care planning discussed with patient/family. Advaced care planning forms reviewed,discussed /completed, 20 min spent    goals of care discussed with daughter Myranda health care proxy, pt is DNR DNI and hospice discussed.    75minutes spent on this visit, 50% visit time spent in care co-ordination with other attendings and counselling patient    date of service 2/5/2020

## 2020-02-05 NOTE — PROGRESS NOTE ADULT - PROVIDER SPECIALTY LIST ADULT
Palliative Care
Pulmonology
Pulmonology
Infectious Disease
Pulmonology
Internal Medicine

## 2020-02-05 NOTE — DISCHARGE NOTE PROVIDER - NSDCCPCAREPLAN_GEN_ALL_CORE_FT
PRINCIPAL DISCHARGE DIAGNOSIS  Diagnosis: Pneumonia  Assessment and Plan of Treatment: for comfort care      SECONDARY DISCHARGE DIAGNOSES  Diagnosis: Dementia  Assessment and Plan of Treatment: for comfort care PRINCIPAL DISCHARGE DIAGNOSIS  Diagnosis: Dementia  Assessment and Plan of Treatment: for comfort care      SECONDARY DISCHARGE DIAGNOSES  Diagnosis: Failure to thrive in adult  Assessment and Plan of Treatment: comfort care    Diagnosis: Dementia  Assessment and Plan of Treatment: for comfort care

## 2020-02-05 NOTE — DISCHARGE NOTE NURSING/CASE MANAGEMENT/SOCIAL WORK - PATIENT PORTAL LINK FT
You can access the FollowMyHealth Patient Portal offered by [facility] by registering at the following website: [email or web address]. By joining [facility]’s FollowMyHealth portal, you will also be able to view your health information using other applications (apps) compatible with our system.

## 2020-02-05 NOTE — PROGRESS NOTE ADULT - SUBJECTIVE AND OBJECTIVE BOX
Date/Time Patient Seen:  		  Referring MD:   Data Reviewed	       Patient is a 89y old  Female who presents with a chief complaint of fever (03 Feb 2020 18:57)      Subjective/HPI     PAST MEDICAL & SURGICAL HISTORY:  Decubital ulcer: sacrum  Former smoker  Dysphagia  Sick sinus syndrome  Syncope  Dementia  Parkinsons  COPD (chronic obstructive pulmonary disease)  History of cholecystectomy  History of cholecystectomy        Medication list         MEDICATIONS  (STANDING):  albuterol/ipratropium for Nebulization 3 milliLiter(s) Nebulizer every 8 hours  carbidopa/levodopa  25/100 1.5 Tablet(s) Oral <User Schedule>  carbidopa/levodopa CR 50/200 1 Tablet(s) Oral <User Schedule>  enoxaparin Injectable 40 milliGRAM(s) SubCutaneous daily  lactobacillus acidophilus 1 Tablet(s) Oral two times a day with meals  melatonin 3 milliGRAM(s) Oral at bedtime  pimavanserin Capsule 34 milliGRAM(s) Oral at bedtime  piperacillin/tazobactam IVPB.. 3.375 Gram(s) IV Intermittent every 8 hours  QUEtiapine 25 milliGRAM(s) Oral at bedtime  rivastigmine 6 milliGRAM(s) Oral two times a day  rOPINIRole 2 milliGRAM(s) Oral three times a day  sodium chloride 0.9%. 1000 milliLiter(s) (50 mL/Hr) IV Continuous <Continuous>    MEDICATIONS  (PRN):  acetaminophen  Suppository .. 650 milliGRAM(s) Rectal every 6 hours PRN Temp greater or equal to 38C (100.4F), Mild Pain (1 - 3), Moderate Pain (4 - 6)  zinc oxide 20% Ointment 1 Application(s) Topical three times a day PRN Soiling         Vitals log        ICU Vital Signs Last 24 Hrs  T(C): 36.3 (04 Feb 2020 05:29), Max: 36.8 (03 Feb 2020 22:24)  T(F): 97.4 (04 Feb 2020 05:29), Max: 98.2 (03 Feb 2020 22:24)  HR: 68 (04 Feb 2020 08:30) (61 - 79)  BP: 113/63 (04 Feb 2020 05:29) (86/52 - 113/63)  BP(mean): --  ABP: --  ABP(mean): --  RR: 20 (04 Feb 2020 05:29) (18 - 20)  SpO2: 98% (04 Feb 2020 08:30) (92% - 98%)           Input and Output:  I&O's Detail    03 Feb 2020 07:01  -  04 Feb 2020 07:00  --------------------------------------------------------  IN:    IV PiggyBack: 100 mL    sodium chloride 0.9%.: 450 mL  Total IN: 550 mL    OUT:    Voided: 700 mL  Total OUT: 700 mL    Total NET: -150 mL          Lab Data                  Review of Systems	      Objective     Physical Examination    heart s1s2  lung dec BS  abd soft  head nc  head at  frail  weak  contracted extr      Pertinent Lab findings & Imaging      Rosario:  NO   Adequate UO     I&O's Detail    03 Feb 2020 07:01  -  04 Feb 2020 07:00  --------------------------------------------------------  IN:    IV PiggyBack: 100 mL    sodium chloride 0.9%.: 450 mL  Total IN: 550 mL    OUT:    Voided: 700 mL  Total OUT: 700 mL    Total NET: -150 mL               Discussed with:     Cultures:	        Radiology
JOEL ORTIZ is a 89yFemale , patient examined and chart reviewed.     INTERVAL HPI/ OVERNIGHT EVENTS:   Afebrile. No events. Confused.    PAST MEDICAL & SURGICAL HISTORY:  Decubital ulcer: sacrum  Former smoker  Dysphagia  Sick sinus syndrome  Syncope  Dementia  Parkinsons  COPD (chronic obstructive pulmonary disease)  History of cholecystectomy  History of cholecystectomy      For details regarding the patient's social history, family history, and other miscellaneous elements, please refer the initial infectious diseases consultation and/or the admitting history and physical examination for this admission.    ROS:  Unable to obtain due to : pt's condition    ALLERGIES:  Namenda (Other (Severe))    Current inpatient medications :  MEDICATIONS  (PRN):  atropine 1% Ophthalmic Solution for SubLingual Use 3 Drop(s) SubLingual four times a day PRN oral secretions  HYDROmorphone  Injectable 0.5 milliGRAM(s) IV Push every 90 minutes PRN dyspnea, distress, suffering, pain,  morphine Concentrate 5 milliGRAM(s) Oral every 3 hours PRN pain, distress, suffering, dyspnea, palliative measures -    Objective:  Vital Signs Last 24 Hrs  T(C): 36.5 (04 Feb 2020 09:32), Max: 36.8 (03 Feb 2020 22:24)  T(F): 97.7 (04 Feb 2020 09:32), Max: 98.2 (03 Feb 2020 22:24)  HR: 74 (04 Feb 2020 09:32) (61 - 79)  BP: 116/70 (04 Feb 2020 09:32) (99/57 - 116/70)  RR: 19 (04 Feb 2020 09:32) (18 - 20)  SpO2: 98% (04 Feb 2020 09:32) (92% - 98%)      Physical Exam:  General: lethargic  Eyes: sclera anicteric, pupils equal and reactive to light  ENMT: buccal mucosa moist, pharynx not injected  Neck: supple, trachea midline  Lungs: Decreased no wheeze/rhonchi  Cardiovascular: regular rate and rhythm, S1 S2  Abdomen: soft, nontender, no organomegaly present, bowel sounds normal  Neurological: Lethargic  Skin: no increased ecchymosis/petechiae/purpura  Lymph Nodes: no palpable cervical/supraclavicular lymph nodes enlargements  Extremities: no cyanosis/clubbing/edema      LABS:  No labs    MICROBIOLOGY:    Culture - Blood (collected 01 Feb 2020 15:30)  Source: .Blood Blood-Peripheral  Preliminary Report (02 Feb 2020 16:00):    No growth to date.    Culture - Blood (collected 01 Feb 2020 15:30)  Source: .Blood Blood-Peripheral  Preliminary Report (02 Feb 2020 16:00):    No growth to date.    Culture - Urine (collected 01 Feb 2020 15:27)  Source: .Urine Clean Catch (Midstream)  Final Report (02 Feb 2020 16:32):    No growth    FLU A B RSV Detection by PCR (01.31.20 @ 23:45)    Flu A Result: NotDetec: The Flu A B RSV assay is a Real-Time PCR test for the qualitative  detection and differentiation of Influenza A, Influenza B, and  Respiratory Syncytial Virus on nasopharyngeal swabs. The results should  be interpreted in the context of all clinical and laboratory findings.    Flu B Result: NotDetec    RSV Result: NotDetec    RADIOLOGY & ADDITIONAL STUDIES:    EXAM:  CT CHEST                                  PROCEDURE DATE:  02/01/2020          INTERPRETATION:  CLINICAL INFORMATION: Shortness of breath and dyspnea on exertion     COMPARISON: Chest x-ray of earlier in the day    PROCEDURE:   CT of the Chest was performed without intravenous contrast.  Sagittal and coronal reformats were performed.      FINDINGS: The study is limited by motion    LUNGS AND AIRWAYS: Patent central airways.  Lungs are demonstrate linear atelectasis at the lung bases    PLEURA: Minimal bilateral pleural effusions    MEDIASTINUM AND CESAR: No lymphadenopathy.    VESSELS: Coronary artery calcification is appreciated    HEART: Heart size is normal. No pericardial effusion. A dual-lead pacemaker enters from the left    CHEST WALL AND LOWER NECK: Within normal limits.    VISUALIZED UPPER ABDOMEN: Cholecystectomy    BONES: Within normal limits.    IMPRESSION:     Linear atelectasis at the lung bases with minimal bilateral pleural effusions.  Pacemaker      Assessment :  89YO F with dementia, parkinson, bedbound, decubitus ulcer here with failure to thrive found to have fever/ leukocytosis, Possibly viral. Dehydration. Cultures ngtd. CT chest with atelectasis. Made comfort care.    Plan:  Comfort care now  Will sign off    D/w Dr Castillo      Continue with present regiment.  Appropriate use of antibiotics and adverse effects reviewed.      I have discussed the above plan of care with patient/ family in detail. They expressed understanding of the  treatment plan . Risks, benefits and alternatives discussed in detail. I have asked if they have any questions or concerns and appropriately addressed them to the best of my ability .    25 minutes were spent in direct patient care reviewing notes, medications ,labs data/ imaging , discussion with multidisciplinary team.    Thank you for allowing me to participate in care of your patient .    Heron Ahmadi MD  Infectious Disease  [phone]
Date/Time Patient Seen:  		  Referring MD:   Data Reviewed	       Patient is a 89y old  Female who presents with a chief complaint of fever (01 Feb 2020 13:58)      Subjective/HPI     PAST MEDICAL & SURGICAL HISTORY:  Decubital ulcer: sacrum  Former smoker  Dysphagia  Sick sinus syndrome  Syncope  Dementia  Parkinsons  COPD (chronic obstructive pulmonary disease)  History of cholecystectomy  History of cholecystectomy        Medication list         MEDICATIONS  (STANDING):  albuterol/ipratropium for Nebulization 3 milliLiter(s) Nebulizer every 8 hours  carbidopa/levodopa  25/100 1.5 Tablet(s) Oral <User Schedule>  carbidopa/levodopa CR 50/200 1 Tablet(s) Oral <User Schedule>  collagenase Ointment 1 Application(s) Topical two times a day  enoxaparin Injectable 40 milliGRAM(s) SubCutaneous daily  lactobacillus acidophilus 1 Tablet(s) Oral two times a day with meals  melatonin 3 milliGRAM(s) Oral at bedtime  pimavanserin Capsule 34 milliGRAM(s) Oral at bedtime  piperacillin/tazobactam IVPB.. 3.375 Gram(s) IV Intermittent every 8 hours  QUEtiapine 25 milliGRAM(s) Oral at bedtime  rivastigmine 6 milliGRAM(s) Oral two times a day  rOPINIRole 2 milliGRAM(s) Oral three times a day  sodium chloride 0.9%. 1000 milliLiter(s) (50 mL/Hr) IV Continuous <Continuous>  vancomycin  IVPB 1000 milliGRAM(s) IV Intermittent every 24 hours    MEDICATIONS  (PRN):  acetaminophen  Suppository .. 650 milliGRAM(s) Rectal every 6 hours PRN Temp greater or equal to 38C (100.4F), Mild Pain (1 - 3), Moderate Pain (4 - 6)         Vitals log        ICU Vital Signs Last 24 Hrs  T(C): 36.8 (02 Feb 2020 05:43), Max: 37.4 (01 Feb 2020 12:30)  T(F): 98.3 (02 Feb 2020 05:43), Max: 99.3 (01 Feb 2020 12:30)  HR: 67 (02 Feb 2020 05:43) (60 - 82)  BP: 102/50 (02 Feb 2020 06:10) (98/39 - 133/77)  BP(mean): --  ABP: --  ABP(mean): --  RR: 18 (02 Feb 2020 05:43) (15 - 19)  SpO2: 100% (02 Feb 2020 05:43) (94% - 100%)           Input and Output:  I&O's Detail    01 Feb 2020 07:01  -  02 Feb 2020 07:00  --------------------------------------------------------  IN:  Total IN: 0 mL    OUT:    Intermittent Catheterization - Urethral: 2 mL    Voided: 200 mL  Total OUT: 202 mL    Total NET: -202 mL          Lab Data                        10.7   14.94 )-----------( 296      ( 31 Jan 2020 23:41 )             33.1     01-31    138  |  100  |  17  ----------------------------<  124<H>  4.0   |  31  |  0.69    Ca    8.3<L>      31 Jan 2020 23:41  Phos  3.5     02-01  Mg     1.8     02-01    TPro  6.0  /  Alb  1.8<L>  /  TBili  0.3  /  DBili  x   /  AST  37  /  ALT  22  /  AlkPhos  88  01-31            Review of Systems	      Objective     Physical Examination    heart s1s2  lung dec BS  frail  weak  head nc      Pertinent Lab findings & Imaging      Rosario:  NO   Adequate UO     I&O's Detail    01 Feb 2020 07:01  -  02 Feb 2020 07:00  --------------------------------------------------------  IN:  Total IN: 0 mL    OUT:    Intermittent Catheterization - Urethral: 2 mL    Voided: 200 mL  Total OUT: 202 mL    Total NET: -202 mL               Discussed with:     Cultures:	        Radiology
Date/Time Patient Seen:  		  Referring MD:   Data Reviewed	       Patient is a 89y old  Female who presents with a chief complaint of fever (03 Feb 2020 10:03)      Subjective/HPI     PAST MEDICAL & SURGICAL HISTORY:  Decubital ulcer: sacrum  Former smoker  Dysphagia  Sick sinus syndrome  Syncope  Dementia  Parkinsons  COPD (chronic obstructive pulmonary disease)  History of cholecystectomy  History of cholecystectomy        Medication list         MEDICATIONS  (STANDING):  albuterol/ipratropium for Nebulization 3 milliLiter(s) Nebulizer every 8 hours  carbidopa/levodopa  25/100 1.5 Tablet(s) Oral <User Schedule>  carbidopa/levodopa CR 50/200 1 Tablet(s) Oral <User Schedule>  collagenase Ointment 1 Application(s) Topical two times a day  enoxaparin Injectable 40 milliGRAM(s) SubCutaneous daily  lactobacillus acidophilus 1 Tablet(s) Oral two times a day with meals  melatonin 3 milliGRAM(s) Oral at bedtime  pimavanserin Capsule 34 milliGRAM(s) Oral at bedtime  piperacillin/tazobactam IVPB.. 3.375 Gram(s) IV Intermittent every 8 hours  QUEtiapine 25 milliGRAM(s) Oral at bedtime  rivastigmine 6 milliGRAM(s) Oral two times a day  rOPINIRole 2 milliGRAM(s) Oral three times a day  sodium chloride 0.9%. 1000 milliLiter(s) (50 mL/Hr) IV Continuous <Continuous>    MEDICATIONS  (PRN):  acetaminophen  Suppository .. 650 milliGRAM(s) Rectal every 6 hours PRN Temp greater or equal to 38C (100.4F), Mild Pain (1 - 3), Moderate Pain (4 - 6)         Vitals log        ICU Vital Signs Last 24 Hrs  T(C): 36.4 (03 Feb 2020 09:51), Max: 37.6 (02 Feb 2020 18:43)  T(F): 97.5 (03 Feb 2020 09:51), Max: 99.6 (02 Feb 2020 18:43)  HR: 78 (03 Feb 2020 09:51) (67 - 78)  BP: 86/52 (03 Feb 2020 09:51) (86/52 - 123/68)  BP(mean): --  ABP: --  ABP(mean): --  RR: 18 (03 Feb 2020 09:51) (18 - 18)  SpO2: 98% (03 Feb 2020 09:51) (90% - 99%)           Input and Output:  I&O's Detail    02 Feb 2020 07:01  -  03 Feb 2020 07:00  --------------------------------------------------------  IN:    IV PiggyBack: 100 mL    sodium chloride 0.9%.: 950 mL  Total IN: 1050 mL    OUT:    Voided: 150 mL  Total OUT: 150 mL    Total NET: 900 mL          Lab Data                        8.4    12.09 )-----------( 225      ( 02 Feb 2020 07:11 )             26.4     02-02    139  |  107  |  14  ----------------------------<  90  3.7   |  22  |  0.58    Ca    8.1<L>      02 Feb 2020 07:11  Phos  3.5     02-02  Mg     1.8     02-02              Review of Systems	      Objective     Physical Examination  heart s1s2  lung dec BS  abd soft  head at  head nc        Pertinent Lab findings & Imaging      Rosario:  NO   Adequate UO     I&O's Detail    02 Feb 2020 07:01  -  03 Feb 2020 07:00  --------------------------------------------------------  IN:    IV PiggyBack: 100 mL    sodium chloride 0.9%.: 950 mL  Total IN: 1050 mL    OUT:    Voided: 150 mL  Total OUT: 150 mL    Total NET: 900 mL               Discussed with:     Cultures:	        Radiology
Date/Time Patient Seen:  		  Referring MD:   Data Reviewed	       Patient is a 89y old  Female who presents with a chief complaint of fever (04 Feb 2020 11:31)      Subjective/HPI     PAST MEDICAL & SURGICAL HISTORY:  Decubital ulcer: sacrum  Former smoker  Dysphagia  Sick sinus syndrome  Syncope  Dementia  Parkinsons  COPD (chronic obstructive pulmonary disease)  History of cholecystectomy  History of cholecystectomy        Medication list         MEDICATIONS  (STANDING):    MEDICATIONS  (PRN):  atropine 1% Ophthalmic Solution for SubLingual Use 3 Drop(s) SubLingual four times a day PRN oral secretions  HYDROmorphone  Injectable 0.5 milliGRAM(s) IV Push every 90 minutes PRN dyspnea, distress, suffering, pain,  morphine Concentrate 5 milliGRAM(s) Oral every 3 hours PRN pain, distress, suffering, dyspnea, palliative measures -         Vitals log        ICU Vital Signs Last 24 Hrs  T(C): 36.8 (04 Feb 2020 22:02), Max: 36.8 (04 Feb 2020 22:02)  T(F): 98.3 (04 Feb 2020 22:02), Max: 98.3 (04 Feb 2020 22:02)  HR: 79 (04 Feb 2020 22:02) (62 - 79)  BP: 132/50 (04 Feb 2020 22:02) (112/56 - 132/67)  BP(mean): --  ABP: --  ABP(mean): --  RR: 18 (04 Feb 2020 22:02) (18 - 18)  SpO2: 95% (04 Feb 2020 22:02) (94% - 96%)           Input and Output:  I&O's Detail      Lab Data                  Review of Systems	      Objective     Physical Examination    heart s1s2  lung dec BS  abd soft  head nc      Pertinent Lab findings & Imaging      Ponce:  NO   Adequate UO     I&O's Detail           Discussed with:     Cultures:	        Radiology
JOEL ORTIZ is a 89yFemale , patient examined and chart reviewed.     INTERVAL HPI/ OVERNIGHT EVENTS:   Afebrile, No events.    PAST MEDICAL & SURGICAL HISTORY:  Decubital ulcer: sacrum  Former smoker  Dysphagia  Sick sinus syndrome  Syncope  Dementia  Parkinsons  COPD (chronic obstructive pulmonary disease)  History of cholecystectomy  History of cholecystectomy      For details regarding the patient's social history, family history, and other miscellaneous elements, please refer the initial infectious diseases consultation and/or the admitting history and physical examination for this admission.    ROS:  Unable to obtain due to : pt's condition    ALLERGIES:  Namenda (Other (Severe))    Current inpatient medications :    ANTIBIOTICS/RELEVANT:  lactobacillus acidophilus 1 Tablet(s) Oral two times a day with meals  piperacillin/tazobactam IVPB.. 3.375 Gram(s) IV Intermittent every 8 hours      acetaminophen  Suppository .. 650 milliGRAM(s) Rectal every 6 hours PRN  albuterol/ipratropium for Nebulization 3 milliLiter(s) Nebulizer every 8 hours  carbidopa/levodopa  25/100 1.5 Tablet(s) Oral <User Schedule>  carbidopa/levodopa CR 50/200 1 Tablet(s) Oral <User Schedule>  enoxaparin Injectable 40 milliGRAM(s) SubCutaneous daily  melatonin 3 milliGRAM(s) Oral at bedtime  pimavanserin Capsule 34 milliGRAM(s) Oral at bedtime  QUEtiapine 25 milliGRAM(s) Oral at bedtime  rivastigmine 6 milliGRAM(s) Oral two times a day  rOPINIRole 2 milliGRAM(s) Oral three times a day  sodium chloride 0.9%. 1000 milliLiter(s) IV Continuous <Continuous>  zinc oxide 20% Ointment 1 Application(s) Topical three times a day PRN      Objective:    02-02 @ 07:01 - 02-03 @ 07:00  --------------------------------------------------------  IN: 1050 mL / OUT: 150 mL / NET: 900 mL    02-03 @ 07:01 - 02-03 @ 22:58  --------------------------------------------------------  IN: 200 mL / OUT: 0 mL / NET: 200 mL      T(C): 36.8 (02-03-20 @ 22:24), Max: 37.3 (02-03-20 @ 01:15)  HR: 68 (02-03-20 @ 22:24) (61 - 78)  BP: 106/59 (02-03-20 @ 22:24) (86/52 - 106/59)  RR: 18 (02-03-20 @ 22:24) (18 - 18)  SpO2: 96% (02-03-20 @ 22:24) (90% - 98%)        Physical Exam:  General: no acute distress  Eyes: sclera anicteric, pupils equal and reactive to light  ENMT: buccal mucosa moist, pharynx not injected  Neck: supple, trachea midline  Lungs: Decreased no wheeze/rhonchi  Cardiovascular: regular rate and rhythm, S1 S2  Abdomen: soft, nontender, no organomegaly present, bowel sounds normal  Neurological: Confused  Skin: no increased ecchymosis/petechiae/purpura  Lymph Nodes: no palpable cervical/supraclavicular lymph nodes enlargements  Extremities: no cyanosis/clubbing/edema      LABS:                        8.4    12.09 )-----------( 225      ( 02 Feb 2020 07:11 )             26.4       02-02    139  |  107  |  14  ----------------------------<  90  3.7   |  22  |  0.58    Ca    8.1<L>      02 Feb 2020 07:11  Phos  3.5     02-02  Mg     1.8     02-02    MICROBIOLOGY:    Culture - Blood (collected 01 Feb 2020 15:30)  Source: .Blood Blood-Peripheral  Preliminary Report (02 Feb 2020 16:00):    No growth to date.    Culture - Blood (collected 01 Feb 2020 15:30)  Source: .Blood Blood-Peripheral  Preliminary Report (02 Feb 2020 16:00):    No growth to date.    Culture - Urine (collected 01 Feb 2020 15:27)  Source: .Urine Clean Catch (Midstream)  Final Report (02 Feb 2020 16:32):    No growth    FLU A B RSV Detection by PCR (01.31.20 @ 23:45)    Flu A Result: NotDete: The Flu A B RSV assay is a Real-Time PCR test for the qualitative  detection and differentiation of Influenza A, Influenza B, and  Respiratory Syncytial Virus on nasopharyngeal swabs. The results should  be interpreted in the context of all clinical and laboratory findings.    Flu B Result: NotDete    RSV Result: NotDetec    RADIOLOGY & ADDITIONAL STUDIES:    EXAM:  CT CHEST                                  PROCEDURE DATE:  02/01/2020          INTERPRETATION:  CLINICAL INFORMATION: Shortness of breath and dyspnea on exertion     COMPARISON: Chest x-ray of earlier in the day    PROCEDURE:   CT of the Chest was performed without intravenous contrast.  Sagittal and coronal reformats were performed.      FINDINGS: The study is limited by motion    LUNGS AND AIRWAYS: Patent central airways.  Lungs are demonstrate linear atelectasis at the lung bases    PLEURA: Minimal bilateral pleural effusions    MEDIASTINUM AND CESAR: No lymphadenopathy.    VESSELS: Coronary artery calcification is appreciated    HEART: Heart size is normal. No pericardial effusion. A dual-lead pacemaker enters from the left    CHEST WALL AND LOWER NECK: Within normal limits.    VISUALIZED UPPER ABDOMEN: Cholecystectomy    BONES: Within normal limits.    IMPRESSION:     Linear atelectasis at the lung bases with minimal bilateral pleural effusions.  Pacemaker      Assessment :  89YO F with dementia, parkinson, bedbound, decubitus ulcer here with failure to thrive found to have fever/ leukocytosis, Possibly viral. Dehydration. Cultures ngtd. CT chest with atelectasis    Plan:  Empiric Zosyn  Trend temps and cbc  Fu cultures  Asp precautions  Wound care  Frequent turning    D/w Dr Castillo      Continue with present regiment.  Appropriate use of antibiotics and adverse effects reviewed.      I have discussed the above plan of care with patient/ family in detail. They expressed understanding of the  treatment plan . Risks, benefits and alternatives discussed in detail. I have asked if they have any questions or concerns and appropriately addressed them to the best of my ability .    > 35 minutes were spent in direct patient care reviewing notes, medications ,labs data/ imaging , discussion with multidisciplinary team.    Thank you for allowing me to participate in care of your patient .    Heron Ahmadi MD  Infectious Disease  [phone]
JOEL ORTIZ is a 89yFemale , patient examined and chart reviewed.     INTERVAL HPI/ OVERNIGHT EVENTS:   Afebrile. No events. Confused.    PAST MEDICAL & SURGICAL HISTORY:  Decubital ulcer: sacrum  Former smoker  Dysphagia  Sick sinus syndrome  Syncope  Dementia  Parkinsons  COPD (chronic obstructive pulmonary disease)  History of cholecystectomy  History of cholecystectomy      For details regarding the patient's social history, family history, and other miscellaneous elements, please refer the initial infectious diseases consultation and/or the admitting history and physical examination for this admission.    ROS:  Unable to obtain due to : pt's condition    ALLERGIES:  Namenda (Other (Severe))    Current inpatient medications :    ANTIBIOTICS/RELEVANT:  piperacillin/tazobactam IVPB.. 3.375 Gram(s) IV Intermittent every 8 hours    MEDICATIONS  (STANDING):  albuterol/ipratropium for Nebulization 3 milliLiter(s) Nebulizer every 8 hours  carbidopa/levodopa  25/100 1.5 Tablet(s) Oral <User Schedule>  carbidopa/levodopa CR 50/200 1 Tablet(s) Oral <User Schedule>  enoxaparin Injectable 40 milliGRAM(s) SubCutaneous daily  lactobacillus acidophilus 1 Tablet(s) Oral two times a day with meals  melatonin 3 milliGRAM(s) Oral at bedtime  pimavanserin Capsule 34 milliGRAM(s) Oral at bedtime  QUEtiapine 25 milliGRAM(s) Oral at bedtime  rivastigmine 6 milliGRAM(s) Oral two times a day  rOPINIRole 2 milliGRAM(s) Oral three times a day  sodium chloride 0.9%. 1000 milliLiter(s) (50 mL/Hr) IV Continuous <Continuous>    MEDICATIONS  (PRN):  acetaminophen  Suppository .. 650 milliGRAM(s) Rectal every 6 hours PRN Temp greater or equal to 38C (100.4F), Mild Pain (1 - 3), Moderate Pain (4 - 6)  zinc oxide 20% Ointment 1 Application(s) Topical three times a day PRN Soiling        Objective:  Vital Signs Last 24 Hrs  T(C): 36.5 (04 Feb 2020 09:32), Max: 36.8 (03 Feb 2020 22:24)  T(F): 97.7 (04 Feb 2020 09:32), Max: 98.2 (03 Feb 2020 22:24)  HR: 74 (04 Feb 2020 09:32) (61 - 79)  BP: 116/70 (04 Feb 2020 09:32) (99/57 - 116/70)  RR: 19 (04 Feb 2020 09:32) (18 - 20)  SpO2: 98% (04 Feb 2020 09:32) (92% - 98%)      Physical Exam:  General: no acute distress  Eyes: sclera anicteric, pupils equal and reactive to light  ENMT: buccal mucosa moist, pharynx not injected  Neck: supple, trachea midline  Lungs: Decreased no wheeze/rhonchi  Cardiovascular: regular rate and rhythm, S1 S2  Abdomen: soft, nontender, no organomegaly present, bowel sounds normal  Neurological: Confused  Skin: no increased ecchymosis/petechiae/purpura  Lymph Nodes: no palpable cervical/supraclavicular lymph nodes enlargements  Extremities: no cyanosis/clubbing/edema      LABS:                        8.4    12.09 )-----------( 225      ( 02 Feb 2020 07:11 )             26.4       02-02    139  |  107  |  14  ----------------------------<  90  3.7   |  22  |  0.58    Ca    8.1<L>      02 Feb 2020 07:11  Phos  3.5     02-02  Mg     1.8     02-02    MICROBIOLOGY:    Culture - Blood (collected 01 Feb 2020 15:30)  Source: .Blood Blood-Peripheral  Preliminary Report (02 Feb 2020 16:00):    No growth to date.    Culture - Blood (collected 01 Feb 2020 15:30)  Source: .Blood Blood-Peripheral  Preliminary Report (02 Feb 2020 16:00):    No growth to date.    Culture - Urine (collected 01 Feb 2020 15:27)  Source: .Urine Clean Catch (Midstream)  Final Report (02 Feb 2020 16:32):    No growth    FLU A B RSV Detection by PCR (01.31.20 @ 23:45)    Flu A Result: [facility]: The Flu A B RSV assay is a Real-Time PCR test for the qualitative  detection and differentiation of Influenza A, Influenza B, and  Respiratory Syncytial Virus on nasopharyngeal swabs. The results should  be interpreted in the context of all clinical and laboratory findings.    Flu B Result: [facility]    RSV Result: [facility]    RADIOLOGY & ADDITIONAL STUDIES:    EXAM:  CT CHEST                                  PROCEDURE DATE:  02/01/2020          INTERPRETATION:  CLINICAL INFORMATION: Shortness of breath and dyspnea on exertion     COMPARISON: Chest x-ray of earlier in the day    PROCEDURE:   CT of the Chest was performed without intravenous contrast.  Sagittal and coronal reformats were performed.      FINDINGS: The study is limited by motion    LUNGS AND AIRWAYS: Patent central airways.  Lungs are demonstrate linear atelectasis at the lung bases    PLEURA: Minimal bilateral pleural effusions    MEDIASTINUM AND CESAR: No lymphadenopathy.    VESSELS: Coronary artery calcification is appreciated    HEART: Heart size is normal. No pericardial effusion. A dual-lead pacemaker enters from the left    CHEST WALL AND LOWER NECK: Within normal limits.    VISUALIZED UPPER ABDOMEN: Cholecystectomy    BONES: Within normal limits.    IMPRESSION:     Linear atelectasis at the lung bases with minimal bilateral pleural effusions.  Pacemaker      Assessment :  89YO F with dementia, parkinson, bedbound, decubitus ulcer here with failure to thrive found to have fever/ leukocytosis, Possibly viral. Dehydration. Cultures ngtd. CT chest with atelectasis. Overall stable.    Plan:  Dc Zosyn  Monitor off antibiotics  Trend temps and cbc  Asp precautions  Wound care  Frequent turning    D/w Dr Castillo      Continue with present regiment.  Appropriate use of antibiotics and adverse effects reviewed.      I have discussed the above plan of care with patient/ family in detail. They expressed understanding of the  treatment plan . Risks, benefits and alternatives discussed in detail. I have asked if they have any questions or concerns and appropriately addressed them to the best of my ability .    > 35 minutes were spent in direct patient care reviewing notes, medications ,labs data/ imaging , discussion with multidisciplinary team.    Thank you for allowing me to participate in care of your patient .    Heron Ahmadi MD  Infectious Disease  [phone]
Patient is a 89y old  Female who presents with a chief complaint of fever (02 Feb 2020 11:27)      INTERVAL HPI/OVERNIGHT EVENTS:overnight events noted    Home Medications:  acetaminophen 325 mg oral tablet: 2 tab(s) orally every 6 hours, As needed, Mild Pain (1 - 3) (31 Jan 2020 22:38)  Melatonin 3 mg oral tablet: 1 tab(s) orally once a day (at bedtime) (01 Feb 2020 00:55)  minocycline 100 mg oral capsule: 1 cap(s) orally every 12 hours (01 Feb 2020 00:55)  Nuplazid 34 mg oral capsule: 1 cap(s) orally once a day (01 Feb 2020 00:55)  rivastigmine 6 mg oral capsule: 1 cap(s) orally 2 times a day (01 Feb 2020 00:55)  rOPINIRole 2 mg oral tablet: 1 tab(s) orally 3 times a day Home (31 Jan 2020 22:38)  SEROquel 25 mg oral tablet: 1 tab(s) orally once a day (at bedtime) Home (31 Jan 2020 22:38)  Sinemet 25 mg-100 mg oral tablet: 1.5 tab(s) orally 2 times a day AM and Lunch (31 Jan 2020 22:38)  Sinemet CR 50 mg-200 mg oral tablet, extended release: 1 tab(s) orally once a day (at bedtime) (31 Jan 2020 22:38)      MEDICATIONS  (STANDING):  albuterol/ipratropium for Nebulization 3 milliLiter(s) Nebulizer every 8 hours  carbidopa/levodopa  25/100 1.5 Tablet(s) Oral <User Schedule>  carbidopa/levodopa CR 50/200 1 Tablet(s) Oral <User Schedule>  collagenase Ointment 1 Application(s) Topical two times a day  enoxaparin Injectable 40 milliGRAM(s) SubCutaneous daily  lactobacillus acidophilus 1 Tablet(s) Oral two times a day with meals  melatonin 3 milliGRAM(s) Oral at bedtime  pimavanserin Capsule 34 milliGRAM(s) Oral at bedtime  piperacillin/tazobactam IVPB.. 3.375 Gram(s) IV Intermittent every 8 hours  QUEtiapine 25 milliGRAM(s) Oral at bedtime  rivastigmine 6 milliGRAM(s) Oral two times a day  rOPINIRole 2 milliGRAM(s) Oral three times a day  sodium chloride 0.9%. 1000 milliLiter(s) (50 mL/Hr) IV Continuous <Continuous>    MEDICATIONS  (PRN):  acetaminophen  Suppository .. 650 milliGRAM(s) Rectal every 6 hours PRN Temp greater or equal to 38C (100.4F), Mild Pain (1 - 3), Moderate Pain (4 - 6)      Allergies    No Known Allergies    Intolerances    Namenda (Other (Severe))      REVIEW OF SYSTEMS:  unable as non verbal  Vital Signs Last 24 Hrs  T(C): 36.4 (03 Feb 2020 09:51), Max: 37.6 (02 Feb 2020 18:43)  T(F): 97.5 (03 Feb 2020 09:51), Max: 99.6 (02 Feb 2020 18:43)  HR: 78 (03 Feb 2020 09:51) (67 - 78)  BP: 86/52 (03 Feb 2020 09:51) (86/52 - 123/68)  BP(mean): --  RR: 18 (03 Feb 2020 09:51) (18 - 18)  SpO2: 98% (03 Feb 2020 09:51) (90% - 99%)    PHYSICAL EXAM:  GENERAL:, well-groomed, frail  HEAD:  Atraumatic, Normocephalic  EYES: EOMI, PERRLA, conjunctiva and sclera clear  ENMT: Moist mucous membranes,   NECK: Supple, No JVD, Normal thyroid  NERVOUS SYSTEM:  non verbal bed bound contracted  CHEST/LUNG: Clear to percussion bilaterally; No rales, rhonchi, wheezing, or rubs  HEART: Regular rate and rhythm; No murmurs, rubs, or gallops  ABDOMEN: Soft, Nontender, Nondistended; Bowel sounds present  EXTREMITIES:  2+ Peripheral Pulses, No clubbing, cyanosis, or edema  LYMPH: No lymphadenopathy noted  SKIN: No rashes or lesions    LABS:                        8.4    12.09 )-----------( 225      ( 02 Feb 2020 07:11 )             26.4     02-02    139  |  107  |  14  ----------------------------<  90  3.7   |  22  |  0.58    Ca    8.1<L>      02 Feb 2020 07:11  Phos  3.5     02-02  Mg     1.8     02-02          CAPILLARY BLOOD GLUCOSE            Culture - Blood (collected 02-01-20 @ 15:30)  Source: .Blood Blood-Peripheral  Preliminary Report (02-02-20 @ 16:00):    No growth to date.    Culture - Blood (collected 02-01-20 @ 15:30)  Source: .Blood Blood-Peripheral  Preliminary Report (02-02-20 @ 16:00):    No growth to date.    Culture - Urine (collected 02-01-20 @ 15:27)  Source: .Urine Clean Catch (Midstream)  Final Report (02-02-20 @ 16:32):    No growth        I&O's Summary    02 Feb 2020 07:01  -  03 Feb 2020 07:00  --------------------------------------------------------  IN: 1050 mL / OUT: 150 mL / NET: 900 mL        RADIOLOGY & ADDITIONAL TESTS:    Imaging Personally Reviewed:  [x ] YES  [ ] NO    Consultant(s) Notes Reviewed:  [x ] YES  [ ] NO    Care Discussed with Consultants/Other Providers [ x] YES  [ ] NO
Patient is a 89y old  Female who presents with a chief complaint of fever (04 Feb 2020 09:07)      INTERVAL HPI/OVERNIGHT EVENTS:overnight events noted    Home Medications:  acetaminophen 325 mg oral tablet: 2 tab(s) orally every 6 hours, As needed, Mild Pain (1 - 3) (31 Jan 2020 22:38)  Melatonin 3 mg oral tablet: 1 tab(s) orally once a day (at bedtime) (01 Feb 2020 00:55)  minocycline 100 mg oral capsule: 1 cap(s) orally every 12 hours (01 Feb 2020 00:55)  Nuplazid 34 mg oral capsule: 1 cap(s) orally once a day (01 Feb 2020 00:55)  rivastigmine 6 mg oral capsule: 1 cap(s) orally 2 times a day (01 Feb 2020 00:55)  rOPINIRole 2 mg oral tablet: 1 tab(s) orally 3 times a day Home (31 Jan 2020 22:38)  SEROquel 25 mg oral tablet: 1 tab(s) orally once a day (at bedtime) Home (31 Jan 2020 22:38)  Sinemet 25 mg-100 mg oral tablet: 1.5 tab(s) orally 2 times a day AM and Lunch (31 Jan 2020 22:38)  Sinemet CR 50 mg-200 mg oral tablet, extended release: 1 tab(s) orally once a day (at bedtime) (31 Jan 2020 22:38)      MEDICATIONS  (STANDING):  albuterol/ipratropium for Nebulization 3 milliLiter(s) Nebulizer every 8 hours  carbidopa/levodopa  25/100 1.5 Tablet(s) Oral <User Schedule>  carbidopa/levodopa CR 50/200 1 Tablet(s) Oral <User Schedule>  enoxaparin Injectable 40 milliGRAM(s) SubCutaneous daily  lactobacillus acidophilus 1 Tablet(s) Oral two times a day with meals  melatonin 3 milliGRAM(s) Oral at bedtime  pimavanserin Capsule 34 milliGRAM(s) Oral at bedtime  piperacillin/tazobactam IVPB.. 3.375 Gram(s) IV Intermittent every 8 hours  QUEtiapine 25 milliGRAM(s) Oral at bedtime  rivastigmine 6 milliGRAM(s) Oral two times a day  rOPINIRole 2 milliGRAM(s) Oral three times a day  sodium chloride 0.9%. 1000 milliLiter(s) (50 mL/Hr) IV Continuous <Continuous>    MEDICATIONS  (PRN):  acetaminophen  Suppository .. 650 milliGRAM(s) Rectal every 6 hours PRN Temp greater or equal to 38C (100.4F), Mild Pain (1 - 3), Moderate Pain (4 - 6)  zinc oxide 20% Ointment 1 Application(s) Topical three times a day PRN Soiling      Allergies    No Known Allergies    Intolerances    Namenda (Other (Severe))      REVIEW OF SYSTEMS:   unable as non verbal  Vital Signs Last 24 Hrs  T(C): 36.3 (04 Feb 2020 05:29), Max: 36.8 (03 Feb 2020 22:24)  T(F): 97.4 (04 Feb 2020 05:29), Max: 98.2 (03 Feb 2020 22:24)  HR: 68 (04 Feb 2020 08:30) (61 - 79)  BP: 113/63 (04 Feb 2020 05:29) (99/57 - 113/63)  BP(mean): --  RR: 20 (04 Feb 2020 05:29) (18 - 20)  SpO2: 98% (04 Feb 2020 08:30) (92% - 98%)    PHYSICAL EXAM:  GENERAL: , well-groomed, frail   HEAD:  Atraumatic, Normocephalic  EYES: EOMI, PERRLA, conjunctiva and sclera clear  ENMT: Moist mucous membranes,   NECK: Supple, No JVD, Normal thyroid  NERVOUS SYSTEM:  non verbal , moves all extremeties non purposeful , awake   CHEST/LUNG: Clear to percussion bilaterally; No rales, rhonchi, wheezing, or rubs  HEART: Regular rate and rhythm; No murmurs, rubs, or gallops  ABDOMEN: Soft, Nontender, Nondistended; Bowel sounds present  EXTREMITIES:  2+ Peripheral Pulses, No clubbing, cyanosis, or edema  LYMPH: No lymphadenopathy noted  SKIN: No rashes or lesions    LABS:              CAPILLARY BLOOD GLUCOSE            Culture - Blood (collected 02-01-20 @ 15:30)  Source: .Blood Blood-Peripheral  Preliminary Report (02-02-20 @ 16:00):    No growth to date.    Culture - Blood (collected 02-01-20 @ 15:30)  Source: .Blood Blood-Peripheral  Preliminary Report (02-02-20 @ 16:00):    No growth to date.    Culture - Urine (collected 02-01-20 @ 15:27)  Source: .Urine Clean Catch (Midstream)  Final Report (02-02-20 @ 16:32):    No growth        I&O's Summary    03 Feb 2020 07:01  -  04 Feb 2020 07:00  --------------------------------------------------------  IN: 550 mL / OUT: 700 mL / NET: -150 mL        RADIOLOGY & ADDITIONAL TESTS:    Imaging Personally Reviewed:  [x ] YES  [ ] NO    Consultant(s) Notes Reviewed:  [x ] YES  [ ] NO    Care Discussed with Consultants/Other Providers [x ] YES  [ ] NO
Patient is a 89y old  Female who presents with a chief complaint of fever (2020 07:34)      INTERVAL HPI/OVERNIGHT EVENTS:    Home Medications:  acetaminophen 325 mg oral tablet: 2 tab(s) orally every 6 hours, As needed, Mild Pain (1 - 3) (2020 22:38)  Melatonin 3 mg oral tablet: 1 tab(s) orally once a day (at bedtime) (2020 00:55)  minocycline 100 mg oral capsule: 1 cap(s) orally every 12 hours (2020 00:55)  Nuplazid 34 mg oral capsule: 1 cap(s) orally once a day (2020 00:55)  rivastigmine 6 mg oral capsule: 1 cap(s) orally 2 times a day (2020 00:55)  rOPINIRole 2 mg oral tablet: 1 tab(s) orally 3 times a day Home (2020 22:38)  SEROquel 25 mg oral tablet: 1 tab(s) orally once a day (at bedtime) Home (2020 22:38)  Sinemet 25 mg-100 mg oral tablet: 1.5 tab(s) orally 2 times a day AM and Lunch (2020 22:38)  Sinemet CR 50 mg-200 mg oral tablet, extended release: 1 tab(s) orally once a day (at bedtime) (2020 22:38)      MEDICATIONS  (STANDING):  albuterol/ipratropium for Nebulization 3 milliLiter(s) Nebulizer every 8 hours  carbidopa/levodopa  25/100 1.5 Tablet(s) Oral <User Schedule>  carbidopa/levodopa CR 50/200 1 Tablet(s) Oral <User Schedule>  collagenase Ointment 1 Application(s) Topical two times a day  enoxaparin Injectable 40 milliGRAM(s) SubCutaneous daily  lactobacillus acidophilus 1 Tablet(s) Oral two times a day with meals  melatonin 3 milliGRAM(s) Oral at bedtime  pimavanserin Capsule 34 milliGRAM(s) Oral at bedtime  piperacillin/tazobactam IVPB.. 3.375 Gram(s) IV Intermittent every 8 hours  QUEtiapine 25 milliGRAM(s) Oral at bedtime  rivastigmine 6 milliGRAM(s) Oral two times a day  rOPINIRole 2 milliGRAM(s) Oral three times a day  sodium chloride 0.9%. 1000 milliLiter(s) (50 mL/Hr) IV Continuous <Continuous>  vancomycin  IVPB 1000 milliGRAM(s) IV Intermittent every 24 hours    MEDICATIONS  (PRN):  acetaminophen  Suppository .. 650 milliGRAM(s) Rectal every 6 hours PRN Temp greater or equal to 38C (100.4F), Mild Pain (1 - 3), Moderate Pain (4 - 6)      Allergies    No Known Allergies    Intolerances    Namenda (Other (Severe))      REVIEW OF SYSTEMS:  unable as non verbal  Vital Signs Last 24 Hrs  T(C): 36.4 (2020 10:12), Max: 37.4 (2020 12:30)  T(F): 97.6 (2020 10:12), Max: 99.3 (2020 12:30)  HR: 77 (2020 10:12) (60 - 82)  BP: 107/49 (2020 10:12) (98/39 - 123/90)  BP(mean): --  RR: 18 (2020 10:12) (15 - 19)  SpO2: 99% (2020 10:12) (94% - 100%)    PHYSICAL EXAM:  GENERAL:well-groomed, frail  HEAD:  Atraumatic, Normocephalic  EYES: EOMI, PERRLA, conjunctiva and sclera clear  ENMT: Moist mucous membranes,   NECK: Supple, No JVD, Normal thyroid  NERVOUS SYSTEM: lethargic non verbal confused  CHEST/LUNG: Bs decreased at bases  HEART: Regular rate and rhythm; No murmurs, rubs, or gallops  ABDOMEN: Soft, Nontender, Nondistended; Bowel sounds present  EXTREMITIES:  2+ Peripheral Pulses, No clubbing, cyanosis, or edema, sacral decub unstaeable  LYMPH: No lymphadenopathy noted  SKIN: No rashes or lesions    LABS:                        8.4    12.09 )-----------( 225      ( 2020 07:11 )             26.4     02-    139  |  107  |  14  ----------------------------<  90  3.7   |  22  |  0.58    Ca    8.1<L>      2020 07:11  Phos  3.5     02-  Mg     1.8     02-    TPro  6.0  /  Alb  1.8<L>  /  TBili  0.3  /  DBili  x   /  AST  37  /  ALT  22  /  AlkPhos  88      PT/INR - ( 2020 23:41 )   PT: 13.7 sec;   INR: 1.24 ratio         PTT - ( 2020 23:41 )  PTT:27.8 sec  Urinalysis Basic - ( 2020 23:41 )    Color: Yellow / Appearance: Clear / S.010 / pH: x  Gluc: x / Ketone: Negative  / Bili: Negative / Urobili: 1 mg/dL   Blood: x / Protein: 30 mg/dL / Nitrite: Negative   Leuk Esterase: Trace / RBC: 3-5 /HPF / WBC 3-5   Sq Epi: x / Non Sq Epi: Occasional / Bacteria: x      CAPILLARY BLOOD GLUCOSE              I&O's Summary    2020 07:01  -  2020 07:00  --------------------------------------------------------  IN: 0 mL / OUT: 202 mL / NET: -202 mL        RADIOLOGY & ADDITIONAL TESTS:    Imaging Personally Reviewed:  [x ] YES  [ ] NO    Consultant(s) Notes Reviewed:  [ x] YES  [ ] NO    Care Discussed with Consultants/Other Providers [x ] YES  [ ] NO
Patient is a 89y old  Female who presents with a chief complaint of fever (05 Feb 2020 10:01)      INTERVAL HPI/OVERNIGHT EVENTS:overnight events noted    Home Medications:  acetaminophen 325 mg oral tablet: 2 tab(s) orally every 6 hours, As needed, Mild Pain (1 - 3) (31 Jan 2020 22:38)  Melatonin 3 mg oral tablet: 1 tab(s) orally once a day (at bedtime) (01 Feb 2020 00:55)  minocycline 100 mg oral capsule: 1 cap(s) orally every 12 hours (01 Feb 2020 00:55)  Nuplazid 34 mg oral capsule: 1 cap(s) orally once a day (01 Feb 2020 00:55)  rivastigmine 6 mg oral capsule: 1 cap(s) orally 2 times a day (01 Feb 2020 00:55)  rOPINIRole 2 mg oral tablet: 1 tab(s) orally 3 times a day Home (31 Jan 2020 22:38)  SEROquel 25 mg oral tablet: 1 tab(s) orally once a day (at bedtime) Home (31 Jan 2020 22:38)  Sinemet 25 mg-100 mg oral tablet: 1.5 tab(s) orally 2 times a day AM and Lunch (31 Jan 2020 22:38)  Sinemet CR 50 mg-200 mg oral tablet, extended release: 1 tab(s) orally once a day (at bedtime) (31 Jan 2020 22:38)      MEDICATIONS  (STANDING):    MEDICATIONS  (PRN):  atropine 1% Ophthalmic Solution for SubLingual Use 3 Drop(s) SubLingual four times a day PRN oral secretions  HYDROmorphone  Injectable 0.5 milliGRAM(s) IV Push every 90 minutes PRN dyspnea, distress, suffering, pain,  morphine Concentrate 5 milliGRAM(s) Oral every 3 hours PRN pain, distress, suffering, dyspnea, palliative measures -      Allergies    No Known Allergies    Intolerances    Namenda (Other (Severe))      REVIEW OF SYSTEMS:  unable as non verbal  Vital Signs Last 24 Hrs  T(C): 36.8 (04 Feb 2020 22:02), Max: 36.8 (04 Feb 2020 22:02)  T(F): 98.3 (04 Feb 2020 22:02), Max: 98.3 (04 Feb 2020 22:02)  HR: 79 (04 Feb 2020 22:02) (62 - 79)  BP: 132/50 (04 Feb 2020 22:02) (112/56 - 132/67)  BP(mean): --  RR: 18 (04 Feb 2020 22:02) (18 - 18)  SpO2: 95% (04 Feb 2020 22:02) (94% - 96%)    PHYSICAL EXAM:  GENERAL: well-groomed, frail  HEAD:  Atraumatic, Normocephalic  EYES: EOMI, PERRLA, conjunctiva and sclera clear  ENMT: Moist mucous membranes,   NECK: Supple, No JVD, Normal thyroid  NERVOUS SYSTEM:  non verbal ,lethargic  CHEST/LUNG: Clear to percussion bilaterally; No rales, rhonchi, wheezing, or rubs  HEART: Regular rate and rhythm; No murmurs, rubs, or gallops  ABDOMEN: Soft, Nontender, Nondistended; Bowel sounds present  EXTREMITIES:  2+ Peripheral Pulses, No clubbing, cyanosis, or edema  LYMPH: No lymphadenopathy noted  SKIN: No rashes or lesions    LABS:              CAPILLARY BLOOD GLUCOSE            Culture - Blood (collected 02-01-20 @ 15:30)  Source: .Blood Blood-Peripheral  Preliminary Report (02-02-20 @ 16:00):    No growth to date.    Culture - Blood (collected 02-01-20 @ 15:30)  Source: .Blood Blood-Peripheral  Preliminary Report (02-02-20 @ 16:00):    No growth to date.    Culture - Urine (collected 02-01-20 @ 15:27)  Source: .Urine Clean Catch (Midstream)  Final Report (02-02-20 @ 16:32):    No growth        I&O's Summary      RADIOLOGY & ADDITIONAL TESTS:    Imaging Personally Reviewed:  [x ] YES  [ ] NO    Consultant(s) Notes Reviewed:  [x ] YES  [ ] NO    Care Discussed with Consultants/Other Providers [ x] YES  [ ] NO

## 2020-02-05 NOTE — PROGRESS NOTE ADULT - ASSESSMENT
89 yr old with PMH of COPD (chronic obstructive pulmonary disease)  Decubital ulcer  sacrum, Dementia  Dysphagia  Former smoker  History of cholecystectomy  Parkinsons  Sick sinu,syndrome  Syncope.recently discharged from [facility] after tt for PNA to [facility] still febrile and is failure to thrive, In Ed febrile confused with baseline dementia with behav disorder and x ray suspicion of PNA started on vanc zosyn and admitted for further care , r/o sepsis, has decubitus ulcer present on admission, untageable, advanced dementia , frail , bed bound , poor po intake, , function quadriparesis needs help with all ADL,family wants comfort measures, reffered for palliative care consult , update molst for comfort measures and hospice refferal.stopped Iv abx , Id consult noted likely viral syndrome, cultures negative,linear atelectasis with pl effusion PNA ruled out.sepsis ruled out.dehydration improved with IV fluids, orophryngeal dysphagia,failure to thrive with end stage dementia. failure to thrive sacral decub unstageable.started on comfort measures and reffered to hospice.  d/w son by bed side    Advanced care planning discussed with patient/family. Advaced care planning forms reviewed,discussed /completed, 20 min spent  goals of care discussed with daughter Myranda health care proxy, pt is DNR DNI and hospice discussed.  45 minutes spent on this visit, 50% visit time spent in care co-ordination with other attendings and counselling patient

## 2020-02-05 NOTE — PROGRESS NOTE ADULT - PROBLEM SELECTOR PLAN 1
89YO F with dementia, parkinson, bedbound, decubitus ulcer here with failure to thrive found to have fever/ leukocytosis, Possibly viral. Dehydration. Cultures ngtd. CT chest with atelectasis  MOLST updated - DNR DNI  on ABX emp -   oral and skin care  assist with ADL  chest PT  suction PRN   prognosis is very poor  there are 4 children  discussion about GOC ongoing  Dysphagia diet  hydration in effect  will follow
aspiration precaution, comfort care
vanc zosyn,iv fluids asp precaution
comfort measures  dementia  DNR DNI  CMO status  opioids PRN   oral hygiene  skin care  assist with ADL  supportive measures  vitals daily  dispo pending - dc planning -
ct chest - no evidence of PNA  ID eval noted - Blood cx pending, MRSA screen NEG  on VANCO and ZOSYN  dementia - parkinsons - decubitus - aspiration - copd - atelectasis - frailty - weakness - dysphagia -  labs and imaging reviewed - CXR and CT chest reviewed    HOB elev - asp prec - oral hygiene -   skin care - decubitus care - wound care -   pain assessment  needs assist with all ADL and feeding  pt is DNR DNI - spoke with DTR - discussed GOC -   prognosis POOR.
left a message for the DTR to discuss GOC - and poss Hospice referral -   RVP neg  ct chest - no evidence of PNA  ID eval noted - Blood cx NEG urine cx NEG, MRSA screen NEG  on Zosyn ABX regimen  dementia - parkinsons - decubitus - aspiration - copd - atelectasis - frailty - weakness - dysphagia -  labs and imaging reviewed - CXR and CT chest reviewed    HOB elev - asp prec - oral hygiene -   skin care - decubitus care - wound care -   pain assessment  needs assist with all ADL and feeding  pt is DNR DNI - spoke with DTR   prognosis POOR.

## 2020-02-05 NOTE — DISCHARGE NOTE PROVIDER - NSDCMRMEDTOKEN_GEN_ALL_CORE_FT
atropine 1% ophthalmic solution: 1 drop(s) sublingual every 6 hours  HYDROmorphone: 1 milligram(s) intravenous every 4 hours, As Needed  morphine 20 mg/mL oral concentrate: 0.25 milliliter(s) orally every 3 hours, As needed, pain, distress, suffering, dyspnea, palliative measures -

## 2020-02-06 LAB
CULTURE RESULTS: SIGNIFICANT CHANGE UP
CULTURE RESULTS: SIGNIFICANT CHANGE UP
SPECIMEN SOURCE: SIGNIFICANT CHANGE UP
SPECIMEN SOURCE: SIGNIFICANT CHANGE UP

## 2020-06-24 NOTE — PATIENT PROFILE ADULT. - SPIRITUAL CULTURAL, RELIGIOUS PRACTICES/VALUES, PROFILE
POC reviewed with daughter. Daughter used as  for admission.  Language line used for procedural and anesthesia consents   none

## 2020-07-20 ENCOUNTER — APPOINTMENT (OUTPATIENT)
Dept: ELECTROPHYSIOLOGY | Facility: CLINIC | Age: 85
End: 2020-07-20

## 2022-09-07 NOTE — PATIENT PROFILE ADULT - BRADEN NUTRITION
----- Message from Geovanna Judge Patient Care Assistant sent at 9/7/2022  1:45 PM CDT -----  Type:  Patient Returning Call    Who Called: pt  Who Left Message for Patient: Office  Does the patient know what this is regarding?: yes  Would the patient rather a call back or a response via [employer]?  call  Best Call Back Number: [phone]  Additional Information:           (1) very poor

## 2022-10-07 NOTE — ED ADULT NURSE NOTE - NSFALLRSKASSESASSIST_ED_ALL_ED
Vaccine Information Statement(s) or the Emergency Use Authorization was given today. This has been reviewed, questions answered, and verbal consent given by Patient for injection(s) and administration of Influenza (Inactivated).      Patient tolerated without incident. See immunization grid for documentation.        
yes

## 2023-02-23 NOTE — DISCHARGE NOTE PROVIDER - NSDCADMDATE_GEN_ALL_CORE_FT
Rob Blair is calling to request a refill on the following medication(s):    Last Visit Date (If Applicable):      Next Visit Date:    Visit date not found    Medication Request:  Requested Prescriptions     Pending Prescriptions Disp Refills    Tirzepatide (MOUNJARO) 12.5 MG/0.5ML SOPN SC injection 6 mL 2     Sig: Inject 0.5 mLs into the skin once a week    ALPRAZolam (XANAX) 1 MG tablet 30 tablet 2     Si po qd prn
01-Feb-2020 00:55

## 2023-09-26 RX ORDER — CARBIDOPA AND LEVODOPA 25; 100 MG/1; MG/1
1.5 TABLET ORAL
Qty: 0 | Refills: 0 | DISCHARGE

## 2023-09-26 RX ORDER — CARBIDOPA AND LEVODOPA 25; 100 MG/1; MG/1
1 TABLET ORAL
Qty: 0 | Refills: 0 | DISCHARGE

## 2023-09-26 RX ORDER — LEVETIRACETAM 250 MG/1
1 TABLET, FILM COATED ORAL
Qty: 0 | Refills: 0 | DISCHARGE

## 2023-09-26 RX ORDER — ROPINIROLE 8 MG/1
1 TABLET, FILM COATED, EXTENDED RELEASE ORAL
Qty: 0 | Refills: 0 | DISCHARGE

## 2023-09-26 RX ORDER — RIVASTIGMINE 4.6 MG/24H
1 PATCH, EXTENDED RELEASE TRANSDERMAL
Qty: 0 | Refills: 0 | DISCHARGE

## 2023-09-26 RX ORDER — ASPIRIN/CALCIUM CARB/MAGNESIUM 324 MG
1 TABLET ORAL
Qty: 0 | Refills: 0 | DISCHARGE

## 2023-09-26 RX ORDER — QUETIAPINE FUMARATE 200 MG/1
1 TABLET, FILM COATED ORAL
Qty: 0 | Refills: 0 | DISCHARGE

## 2023-09-26 RX ORDER — CARBIDOPA AND LEVODOPA 25; 100 MG/1; MG/1
2 TABLET ORAL
Qty: 0 | Refills: 0 | DISCHARGE

## 2023-09-26 RX ORDER — MINOCYCLINE HYDROCHLORIDE 45 MG/1
1 TABLET, EXTENDED RELEASE ORAL
Qty: 0 | Refills: 0 | DISCHARGE

## 2023-09-26 RX ORDER — PIMAVANSERIN TARTRATE 10 MG/1
1 TABLET, COATED ORAL
Qty: 0 | Refills: 0 | DISCHARGE

## 2023-09-26 RX ORDER — LANOLIN ALCOHOL/MO/W.PET/CERES
1 CREAM (GRAM) TOPICAL
Qty: 0 | Refills: 0 | DISCHARGE

## 2025-03-07 NOTE — DIETITIAN INITIAL EVALUATION ADULT. - PHYSICAL APPEARANCE
I responded to the patient's inbasket message. Thank you
Pt called the nurse line stating that at her last appointment you discussed with her about talking to a psychiatrist here in our office about putting her on a certain medication.    Please advise.  
BMI 20.1 per 5'2", 110#/underweight/contracted